# Patient Record
Sex: FEMALE | Race: BLACK OR AFRICAN AMERICAN | Employment: FULL TIME | URBAN - METROPOLITAN AREA
[De-identification: names, ages, dates, MRNs, and addresses within clinical notes are randomized per-mention and may not be internally consistent; named-entity substitution may affect disease eponyms.]

---

## 2017-01-10 ENCOUNTER — HOSPITAL ENCOUNTER (OUTPATIENT)
Dept: CV DIAGNOSTICS | Facility: HOSPITAL | Age: 42
Discharge: HOME | End: 2017-01-10
Attending: INTERNAL MEDICINE
Payer: COMMERCIAL

## 2017-01-10 LAB
ATRIAL RATE: 71 BPM
P AXIS: 50 DEGREES
P-R INTERVAL: 178 MS
Q-T INTERVAL: 384 MS
QRS DURATION: 82 MS
QTC CALCULATION (BEZET): 417 MS
R AXIS: 27 DEGREES
T AXIS: 32 DEGREES
VENTRICULAR RATE: 71 BPM

## 2017-01-10 PROCEDURE — 93005 ELECTROCARDIOGRAM TRACING: CPT

## 2017-01-10 PROCEDURE — 93010 ELECTROCARDIOGRAM REPORT: CPT | Performed by: INTERNAL MEDICINE

## 2018-01-16 ENCOUNTER — HOSPITAL ENCOUNTER (OUTPATIENT)
Dept: GENERAL RADIOLOGY | Facility: HOSPITAL | Age: 43
Discharge: HOME | End: 2018-01-16
Attending: INTERNAL MEDICINE
Payer: COMMERCIAL

## 2018-01-16 DIAGNOSIS — R20.0 NUMBNESS: ICD-10-CM

## 2018-01-16 PROCEDURE — 72050 X-RAY EXAM NECK SPINE 4/5VWS: CPT | Performed by: INTERNAL MEDICINE

## 2018-03-07 ASSESSMENT — PAIN SCALES - GENERAL: PAINLEVEL_OUTOF10: 0

## 2018-03-11 ENCOUNTER — CHARTING TRANS (OUTPATIENT)
Dept: OTHER | Age: 43
End: 2018-03-11

## 2018-03-28 ENCOUNTER — CHARTING TRANS (OUTPATIENT)
Dept: OTHER | Age: 43
End: 2018-03-28

## 2018-04-03 ENCOUNTER — CHARTING TRANS (OUTPATIENT)
Dept: OTHER | Age: 43
End: 2018-04-03

## 2018-04-23 ENCOUNTER — HOSPITAL ENCOUNTER (OUTPATIENT)
Dept: GENERAL RADIOLOGY | Facility: HOSPITAL | Age: 43
Discharge: HOME | End: 2018-04-23
Attending: INTERNAL MEDICINE
Payer: COMMERCIAL

## 2018-04-23 ENCOUNTER — EKG ENCOUNTER (OUTPATIENT)
Dept: LAB | Facility: HOSPITAL | Age: 43
End: 2018-04-23
Attending: INTERNAL MEDICINE
Payer: COMMERCIAL

## 2018-04-23 DIAGNOSIS — Z01.818 PRE-OP EXAM: ICD-10-CM

## 2018-04-23 DIAGNOSIS — Z01.818 PRE-OP EXAMINATION: Primary | ICD-10-CM

## 2018-04-23 PROCEDURE — 71046 X-RAY EXAM CHEST 2 VIEWS: CPT | Performed by: INTERNAL MEDICINE

## 2018-04-23 PROCEDURE — 93010 ELECTROCARDIOGRAM REPORT: CPT | Performed by: INTERNAL MEDICINE

## 2018-04-23 PROCEDURE — 93005 ELECTROCARDIOGRAM TRACING: CPT

## 2018-05-02 ENCOUNTER — CHARTING TRANS (OUTPATIENT)
Dept: OTHER | Age: 43
End: 2018-05-02

## 2018-05-07 ENCOUNTER — CHARTING TRANS (OUTPATIENT)
Dept: OTHER | Age: 43
End: 2018-05-07

## 2018-05-11 ENCOUNTER — CHARTING TRANS (OUTPATIENT)
Dept: OTHER | Age: 43
End: 2018-05-11

## 2018-05-22 PROBLEM — M16.11 PRIMARY OSTEOARTHRITIS OF RIGHT HIP: Status: ACTIVE | Noted: 2018-05-22

## 2018-05-30 ENCOUNTER — CHARTING TRANS (OUTPATIENT)
Dept: OTHER | Age: 43
End: 2018-05-30

## 2018-06-13 ENCOUNTER — CHARTING TRANS (OUTPATIENT)
Dept: OTHER | Age: 43
End: 2018-06-13

## 2018-06-21 ENCOUNTER — LAB ENCOUNTER (OUTPATIENT)
Dept: LAB | Facility: HOSPITAL | Age: 43
End: 2018-06-21
Attending: INTERNAL MEDICINE
Payer: COMMERCIAL

## 2018-06-21 DIAGNOSIS — J02.9 SORE THROAT: Primary | ICD-10-CM

## 2018-06-21 DIAGNOSIS — J06.9 URI (UPPER RESPIRATORY INFECTION): ICD-10-CM

## 2018-06-21 PROCEDURE — 87798 DETECT AGENT NOS DNA AMP: CPT

## 2018-06-21 PROCEDURE — 87081 CULTURE SCREEN ONLY: CPT

## 2018-06-21 PROCEDURE — 87581 M.PNEUMON DNA AMP PROBE: CPT

## 2018-06-21 PROCEDURE — 87999 UNLISTED MICROBIOLOGY PX: CPT

## 2018-06-21 PROCEDURE — 87633 RESP VIRUS 12-25 TARGETS: CPT

## 2018-06-21 PROCEDURE — 87486 CHLMYD PNEUM DNA AMP PROBE: CPT

## 2018-07-04 PROBLEM — Z85.528 HISTORY OF KIDNEY CANCER: Status: ACTIVE | Noted: 2018-07-04

## 2018-12-04 PROBLEM — M21.41 PES PLANUS OF BOTH FEET: Status: ACTIVE | Noted: 2018-12-04

## 2018-12-04 PROBLEM — R25.2 SPASMS OF THE HANDS OR FEET: Status: ACTIVE | Noted: 2018-12-04

## 2018-12-04 PROBLEM — M21.42 PES PLANUS OF BOTH FEET: Status: ACTIVE | Noted: 2018-12-04

## 2019-01-02 ENCOUNTER — LAB ENCOUNTER (OUTPATIENT)
Dept: LAB | Facility: HOSPITAL | Age: 44
End: 2019-01-02
Attending: INTERNAL MEDICINE
Payer: COMMERCIAL

## 2019-01-02 DIAGNOSIS — J06.9 URI (UPPER RESPIRATORY INFECTION): Primary | ICD-10-CM

## 2019-01-02 LAB
ADENOVIRUS PCR:: NEGATIVE
B PERT DNA SPEC QL NAA+PROBE: NEGATIVE
C PNEUM DNA SPEC QL NAA+PROBE: NEGATIVE
CORONAVIRUS 229E PCR:: POSITIVE
CORONAVIRUS HKU1 PCR:: NEGATIVE
CORONAVIRUS NL63 PCR:: NEGATIVE
CORONAVIRUS OC43 PCR:: NEGATIVE
FLUAV RNA SPEC QL NAA+PROBE: NEGATIVE
FLUBV RNA SPEC QL NAA+PROBE: NEGATIVE
METAPNEUMOVIRUS PCR:: NEGATIVE
MYCOPLASMA PNEUMONIA PCR:: NEGATIVE
PARAINFLUENZA 1 PCR:: NEGATIVE
PARAINFLUENZA 2 PCR:: NEGATIVE
PARAINFLUENZA 3 PCR:: NEGATIVE
PARAINFLUENZA 4 PCR:: NEGATIVE
RHINOVIRUS/ENTERO PCR:: NEGATIVE
RSV RNA SPEC QL NAA+PROBE: NEGATIVE

## 2019-01-02 PROCEDURE — 87798 DETECT AGENT NOS DNA AMP: CPT

## 2019-01-02 PROCEDURE — 87486 CHLMYD PNEUM DNA AMP PROBE: CPT

## 2019-01-02 PROCEDURE — 87581 M.PNEUMON DNA AMP PROBE: CPT

## 2019-01-02 PROCEDURE — 87633 RESP VIRUS 12-25 TARGETS: CPT

## 2019-01-05 ENCOUNTER — EKG ENCOUNTER (OUTPATIENT)
Dept: LAB | Facility: HOSPITAL | Age: 44
End: 2019-01-05
Attending: INTERNAL MEDICINE
Payer: COMMERCIAL

## 2019-01-05 DIAGNOSIS — Z01.818 PREOP TESTING: Primary | ICD-10-CM

## 2019-01-05 LAB
ATRIAL RATE: 80 BPM
P AXIS: 39 DEGREES
P-R INTERVAL: 172 MS
Q-T INTERVAL: 388 MS
QRS DURATION: 84 MS
QTC CALCULATION (BEZET): 447 MS
R AXIS: 9 DEGREES
T AXIS: 18 DEGREES
VENTRICULAR RATE: 80 BPM

## 2019-01-05 PROCEDURE — 93005 ELECTROCARDIOGRAM TRACING: CPT

## 2019-01-05 PROCEDURE — 93010 ELECTROCARDIOGRAM REPORT: CPT | Performed by: INTERNAL MEDICINE

## 2019-01-18 ENCOUNTER — HOSPITAL ENCOUNTER (OUTPATIENT)
Dept: CV DIAGNOSTICS | Facility: HOSPITAL | Age: 44
Discharge: HOME | End: 2019-01-18
Attending: INTERNAL MEDICINE
Payer: COMMERCIAL

## 2019-01-18 DIAGNOSIS — R94.31 ABNORMAL EKG: ICD-10-CM

## 2019-01-18 PROCEDURE — 93306 TTE W/DOPPLER COMPLETE: CPT | Performed by: INTERNAL MEDICINE

## 2019-01-21 ENCOUNTER — LABORATORY ENCOUNTER (OUTPATIENT)
Dept: LAB | Facility: HOSPITAL | Age: 44
End: 2019-01-21
Payer: COMMERCIAL

## 2019-01-21 ENCOUNTER — HOSPITAL ENCOUNTER (OUTPATIENT)
Dept: PHYSICAL THERAPY | Facility: HOSPITAL | Age: 44
Discharge: HOME | End: 2019-01-21
Attending: ORTHOPAEDIC SURGERY
Payer: COMMERCIAL

## 2019-01-21 DIAGNOSIS — M16.12 OSTEOARTHRITIS OF LEFT HIP: ICD-10-CM

## 2019-01-21 LAB
ANTIBODY SCREEN: NEGATIVE
RH BLOOD TYPE: POSITIVE

## 2019-01-21 PROCEDURE — 87081 CULTURE SCREEN ONLY: CPT

## 2019-01-21 PROCEDURE — 86901 BLOOD TYPING SEROLOGIC RH(D): CPT

## 2019-01-21 PROCEDURE — 86900 BLOOD TYPING SEROLOGIC ABO: CPT

## 2019-01-21 PROCEDURE — 86850 RBC ANTIBODY SCREEN: CPT

## 2019-01-22 ENCOUNTER — ANESTHESIA EVENT (OUTPATIENT)
Dept: SURGERY | Facility: HOSPITAL | Age: 44
End: 2019-01-22

## 2019-01-31 ENCOUNTER — APPOINTMENT (OUTPATIENT)
Dept: GENERAL RADIOLOGY | Facility: HOSPITAL | Age: 44
DRG: 470 | End: 2019-01-31
Attending: PHYSICIAN ASSISTANT
Payer: COMMERCIAL

## 2019-01-31 ENCOUNTER — HOSPITAL ENCOUNTER (INPATIENT)
Facility: HOSPITAL | Age: 44
LOS: 2 days | Discharge: HOME HEALTH | DRG: 470 | End: 2019-02-02
Attending: ORTHOPAEDIC SURGERY | Admitting: ORTHOPAEDIC SURGERY
Payer: COMMERCIAL

## 2019-01-31 ENCOUNTER — ANESTHESIA (OUTPATIENT)
Dept: SURGERY | Facility: HOSPITAL | Age: 44
End: 2019-01-31

## 2019-01-31 DIAGNOSIS — M16.12 PRIMARY OSTEOARTHRITIS OF LEFT HIP: ICD-10-CM

## 2019-01-31 DIAGNOSIS — M16.12 OSTEOARTHRITIS OF LEFT HIP: Primary | ICD-10-CM

## 2019-01-31 LAB
CREAT BLD-MCNC: 1.06 MG/DL (ref 0.55–1.02)
POCT LOT NUMBER: NORMAL
POCT URINE PREGNANCY: NEGATIVE

## 2019-01-31 PROCEDURE — 0SRB04Z REPLACEMENT OF LEFT HIP JOINT WITH CERAMIC ON POLYETHYLENE SYNTHETIC SUBSTITUTE, OPEN APPROACH: ICD-10-PCS | Performed by: ORTHOPAEDIC SURGERY

## 2019-01-31 PROCEDURE — 97530 THERAPEUTIC ACTIVITIES: CPT

## 2019-01-31 PROCEDURE — 81025 URINE PREGNANCY TEST: CPT | Performed by: ORTHOPAEDIC SURGERY

## 2019-01-31 PROCEDURE — 73501 X-RAY EXAM HIP UNI 1 VIEW: CPT | Performed by: PHYSICIAN ASSISTANT

## 2019-01-31 PROCEDURE — 3E0T3BZ INTRODUCTION OF ANESTHETIC AGENT INTO PERIPHERAL NERVES AND PLEXI, PERCUTANEOUS APPROACH: ICD-10-PCS | Performed by: ANESTHESIOLOGY

## 2019-01-31 PROCEDURE — 88304 TISSUE EXAM BY PATHOLOGIST: CPT | Performed by: ORTHOPAEDIC SURGERY

## 2019-01-31 PROCEDURE — 97161 PT EVAL LOW COMPLEX 20 MIN: CPT

## 2019-01-31 PROCEDURE — 88311 DECALCIFY TISSUE: CPT | Performed by: ORTHOPAEDIC SURGERY

## 2019-01-31 PROCEDURE — 76942 ECHO GUIDE FOR BIOPSY: CPT | Performed by: ORTHOPAEDIC SURGERY

## 2019-01-31 PROCEDURE — 82565 ASSAY OF CREATININE: CPT | Performed by: PHYSICIAN ASSISTANT

## 2019-01-31 DEVICE — PINNACLE 100 ACETABULAR SHELL GRIPTION 100 50MM OD
Type: IMPLANTABLE DEVICE | Site: HIP | Status: FUNCTIONAL
Brand: PINNACLE GRIPTION

## 2019-01-31 DEVICE — BIOLOX DELTA CERAMIC FEMORAL HEAD 32MM DIA +1 12/14 TAPER
Type: IMPLANTABLE DEVICE | Site: HIP | Status: FUNCTIONAL
Brand: BIOLOX DELTA

## 2019-01-31 DEVICE — PINNACLE HIP SOLUTIONS ALTRX POLYETHYLENE ACETABULAR LINER +4 NEUTRAL 32MM ID 50MM OD
Type: IMPLANTABLE DEVICE | Site: HIP | Status: FUNCTIONAL
Brand: PINNACLE ALTRX

## 2019-01-31 DEVICE — APEX HOLE ELIMINATOR - PS
Type: IMPLANTABLE DEVICE | Site: HIP | Status: FUNCTIONAL
Brand: APEX

## 2019-01-31 DEVICE — CORAIL HIP SYSTEM CEMENTLESS FEMORAL STEM 12/14 AMT 135 DEGREES KS SIZE 10 HA COATED STANDARD NO COLLAR
Type: IMPLANTABLE DEVICE | Site: HIP | Status: FUNCTIONAL
Brand: CORAIL

## 2019-01-31 RX ORDER — POLYETHYLENE GLYCOL 3350 17 G/17G
17 POWDER, FOR SOLUTION ORAL DAILY PRN
Status: DISCONTINUED | OUTPATIENT
Start: 2019-01-31 | End: 2019-02-02

## 2019-01-31 RX ORDER — ZOLPIDEM TARTRATE 5 MG/1
5 TABLET ORAL NIGHTLY PRN
Status: DISCONTINUED | OUTPATIENT
Start: 2019-01-31 | End: 2019-02-02

## 2019-01-31 RX ORDER — DIPHENHYDRAMINE HYDROCHLORIDE 50 MG/ML
12.5 INJECTION INTRAMUSCULAR; INTRAVENOUS AS NEEDED
Status: DISCONTINUED | OUTPATIENT
Start: 2019-01-31 | End: 2019-01-31 | Stop reason: HOSPADM

## 2019-01-31 RX ORDER — METOCLOPRAMIDE HYDROCHLORIDE 5 MG/ML
10 INJECTION INTRAMUSCULAR; INTRAVENOUS AS NEEDED
Status: DISCONTINUED | OUTPATIENT
Start: 2019-01-31 | End: 2019-01-31 | Stop reason: HOSPADM

## 2019-01-31 RX ORDER — CLINDAMYCIN PHOSPHATE 900 MG/50ML
INJECTION INTRAVENOUS
Status: DISPENSED
Start: 2019-01-31 | End: 2019-01-31

## 2019-01-31 RX ORDER — SCOLOPAMINE TRANSDERMAL SYSTEM 1 MG/1
1 PATCH, EXTENDED RELEASE TRANSDERMAL ONCE
Status: DISCONTINUED | OUTPATIENT
Start: 2019-01-31 | End: 2019-02-02

## 2019-01-31 RX ORDER — CLINDAMYCIN PHOSPHATE 900 MG/50ML
900 INJECTION INTRAVENOUS EVERY 8 HOURS
Status: COMPLETED | OUTPATIENT
Start: 2019-01-31 | End: 2019-01-31

## 2019-01-31 RX ORDER — ACETAMINOPHEN 500 MG
1000 TABLET ORAL ONCE
Status: DISCONTINUED | OUTPATIENT
Start: 2019-01-31 | End: 2019-01-31 | Stop reason: HOSPADM

## 2019-01-31 RX ORDER — METOCLOPRAMIDE HYDROCHLORIDE 5 MG/ML
10 INJECTION INTRAMUSCULAR; INTRAVENOUS EVERY 6 HOURS PRN
Status: ACTIVE | OUTPATIENT
Start: 2019-01-31 | End: 2019-02-02

## 2019-01-31 RX ORDER — SODIUM CHLORIDE, SODIUM LACTATE, POTASSIUM CHLORIDE, CALCIUM CHLORIDE 600; 310; 30; 20 MG/100ML; MG/100ML; MG/100ML; MG/100ML
INJECTION, SOLUTION INTRAVENOUS CONTINUOUS
Status: DISCONTINUED | OUTPATIENT
Start: 2019-01-31 | End: 2019-01-31 | Stop reason: HOSPADM

## 2019-01-31 RX ORDER — HYDROMORPHONE HYDROCHLORIDE 1 MG/ML
0.8 INJECTION, SOLUTION INTRAMUSCULAR; INTRAVENOUS; SUBCUTANEOUS EVERY 2 HOUR PRN
Status: ACTIVE | OUTPATIENT
Start: 2019-01-31 | End: 2019-02-02

## 2019-01-31 RX ORDER — BISACODYL 10 MG
10 SUPPOSITORY, RECTAL RECTAL
Status: DISCONTINUED | OUTPATIENT
Start: 2019-01-31 | End: 2019-02-02

## 2019-01-31 RX ORDER — HYDROCODONE BITARTRATE AND ACETAMINOPHEN 5; 325 MG/1; MG/1
1 TABLET ORAL AS NEEDED
Status: DISCONTINUED | OUTPATIENT
Start: 2019-01-31 | End: 2019-01-31 | Stop reason: HOSPADM

## 2019-01-31 RX ORDER — HYDROCODONE BITARTRATE AND ACETAMINOPHEN 5; 325 MG/1; MG/1
2 TABLET ORAL AS NEEDED
Status: DISCONTINUED | OUTPATIENT
Start: 2019-01-31 | End: 2019-01-31 | Stop reason: HOSPADM

## 2019-01-31 RX ORDER — HYDROMORPHONE HYDROCHLORIDE 1 MG/ML
0.4 INJECTION, SOLUTION INTRAMUSCULAR; INTRAVENOUS; SUBCUTANEOUS EVERY 2 HOUR PRN
Status: ACTIVE | OUTPATIENT
Start: 2019-01-31 | End: 2019-02-02

## 2019-01-31 RX ORDER — SODIUM CHLORIDE, SODIUM LACTATE, POTASSIUM CHLORIDE, CALCIUM CHLORIDE 600; 310; 30; 20 MG/100ML; MG/100ML; MG/100ML; MG/100ML
INJECTION, SOLUTION INTRAVENOUS CONTINUOUS
Status: DISCONTINUED | OUTPATIENT
Start: 2019-01-31 | End: 2019-02-02

## 2019-01-31 RX ORDER — ONDANSETRON 2 MG/ML
4 INJECTION INTRAMUSCULAR; INTRAVENOUS AS NEEDED
Status: DISCONTINUED | OUTPATIENT
Start: 2019-01-31 | End: 2019-01-31 | Stop reason: HOSPADM

## 2019-01-31 RX ORDER — SODIUM CHLORIDE 9 MG/ML
INJECTION, SOLUTION INTRAVENOUS CONTINUOUS
Status: DISCONTINUED | OUTPATIENT
Start: 2019-01-31 | End: 2019-02-02

## 2019-01-31 RX ORDER — ONDANSETRON 2 MG/ML
4 INJECTION INTRAMUSCULAR; INTRAVENOUS EVERY 4 HOURS PRN
Status: DISPENSED | OUTPATIENT
Start: 2019-01-31 | End: 2019-02-02

## 2019-01-31 RX ORDER — CLINDAMYCIN PHOSPHATE 900 MG/50ML
900 INJECTION INTRAVENOUS ONCE
Status: COMPLETED | OUTPATIENT
Start: 2019-01-31 | End: 2019-01-31

## 2019-01-31 RX ORDER — SCOLOPAMINE TRANSDERMAL SYSTEM 1 MG/1
PATCH, EXTENDED RELEASE TRANSDERMAL
Status: DISCONTINUED
Start: 2019-01-31 | End: 2019-02-02

## 2019-01-31 RX ORDER — OXYCODONE HYDROCHLORIDE 10 MG/1
10 TABLET ORAL EVERY 4 HOURS PRN
Status: DISCONTINUED | OUTPATIENT
Start: 2019-01-31 | End: 2019-02-01

## 2019-01-31 RX ORDER — HYDROMORPHONE HYDROCHLORIDE 1 MG/ML
0.4 INJECTION, SOLUTION INTRAMUSCULAR; INTRAVENOUS; SUBCUTANEOUS EVERY 5 MIN PRN
Status: DISCONTINUED | OUTPATIENT
Start: 2019-01-31 | End: 2019-01-31 | Stop reason: HOSPADM

## 2019-01-31 RX ORDER — OXYCODONE HYDROCHLORIDE 15 MG/1
15 TABLET ORAL EVERY 4 HOURS PRN
Status: DISCONTINUED | OUTPATIENT
Start: 2019-01-31 | End: 2019-02-01

## 2019-01-31 RX ORDER — MELATONIN
325
Status: DISCONTINUED | OUTPATIENT
Start: 2019-01-31 | End: 2019-02-02

## 2019-01-31 RX ORDER — OXYCODONE HYDROCHLORIDE 5 MG/1
5 TABLET ORAL EVERY 4 HOURS PRN
Status: DISCONTINUED | OUTPATIENT
Start: 2019-01-31 | End: 2019-02-01

## 2019-01-31 RX ORDER — TIZANIDINE 4 MG/1
4 TABLET ORAL 3 TIMES DAILY PRN
Status: DISCONTINUED | OUTPATIENT
Start: 2019-01-31 | End: 2019-02-02

## 2019-01-31 RX ORDER — HYDROMORPHONE HYDROCHLORIDE 1 MG/ML
0.2 INJECTION, SOLUTION INTRAMUSCULAR; INTRAVENOUS; SUBCUTANEOUS EVERY 2 HOUR PRN
Status: ACTIVE | OUTPATIENT
Start: 2019-01-31 | End: 2019-02-02

## 2019-01-31 RX ORDER — NALOXONE HYDROCHLORIDE 0.4 MG/ML
80 INJECTION, SOLUTION INTRAMUSCULAR; INTRAVENOUS; SUBCUTANEOUS AS NEEDED
Status: DISCONTINUED | OUTPATIENT
Start: 2019-01-31 | End: 2019-01-31 | Stop reason: HOSPADM

## 2019-01-31 RX ORDER — DIPHENHYDRAMINE HYDROCHLORIDE 50 MG/ML
25 INJECTION INTRAMUSCULAR; INTRAVENOUS ONCE AS NEEDED
Status: ACTIVE | OUTPATIENT
Start: 2019-01-31 | End: 2019-01-31

## 2019-01-31 RX ORDER — DIPHENHYDRAMINE HCL 25 MG
25 CAPSULE ORAL EVERY 4 HOURS PRN
Status: DISCONTINUED | OUTPATIENT
Start: 2019-01-31 | End: 2019-02-02

## 2019-01-31 RX ORDER — ACETAMINOPHEN 325 MG/1
TABLET ORAL
Status: COMPLETED
Start: 2019-01-31 | End: 2019-01-31

## 2019-01-31 RX ORDER — DIPHENHYDRAMINE HYDROCHLORIDE 50 MG/ML
12.5 INJECTION INTRAMUSCULAR; INTRAVENOUS EVERY 4 HOURS PRN
Status: DISCONTINUED | OUTPATIENT
Start: 2019-01-31 | End: 2019-02-02

## 2019-01-31 RX ORDER — ACETAMINOPHEN 325 MG/1
650 TABLET ORAL ONCE
Status: COMPLETED | OUTPATIENT
Start: 2019-01-31 | End: 2019-01-31

## 2019-01-31 RX ORDER — LABETALOL HYDROCHLORIDE 5 MG/ML
5 INJECTION, SOLUTION INTRAVENOUS EVERY 5 MIN PRN
Status: DISCONTINUED | OUTPATIENT
Start: 2019-01-31 | End: 2019-01-31 | Stop reason: HOSPADM

## 2019-01-31 RX ORDER — KETOROLAC TROMETHAMINE 30 MG/ML
30 INJECTION, SOLUTION INTRAMUSCULAR; INTRAVENOUS EVERY 6 HOURS
Status: COMPLETED | OUTPATIENT
Start: 2019-01-31 | End: 2019-02-01

## 2019-01-31 RX ORDER — SODIUM PHOSPHATE, DIBASIC AND SODIUM PHOSPHATE, MONOBASIC 7; 19 G/133ML; G/133ML
1 ENEMA RECTAL ONCE AS NEEDED
Status: DISCONTINUED | OUTPATIENT
Start: 2019-01-31 | End: 2019-02-02

## 2019-01-31 RX ORDER — ACETAMINOPHEN 325 MG/1
650 TABLET ORAL 4 TIMES DAILY
Status: DISCONTINUED | OUTPATIENT
Start: 2019-01-31 | End: 2019-02-01

## 2019-01-31 RX ORDER — DOCUSATE SODIUM 100 MG/1
100 CAPSULE, LIQUID FILLED ORAL 2 TIMES DAILY
Status: DISCONTINUED | OUTPATIENT
Start: 2019-01-31 | End: 2019-02-02

## 2019-01-31 NOTE — ANESTHESIA PREPROCEDURE EVALUATION
PRE-OP EVALUATION    Patient Name: Caren Vargas    Pre-op Diagnosis: Primary osteoarthritis of left hip [M16.12]    Procedure(s):  LEFT TOTAL HIP ARTHROPLASTY    Surgeon(s) and Role:     Cassia Shah MD - Primary    Pre-op vitals reviewed. Never Smoker      Smokeless tobacco: Never Used      Tobacco comment: caffeine: 1 c coffee qam    Alcohol use: No      Alcohol/week: 0.0 oz      Drug use: No     ----------------------------------------------------------------------------    Conclusions: agrees to proceed.

## 2019-01-31 NOTE — BRIEF OP NOTE
Pre-Operative Diagnosis: Primary osteoarthritis of left hip [M16.12]     Post-Operative Diagnosis: Primary osteoarthritis of left hip [M16.12]      Procedure Performed:   Procedure(s):  LEFT TOTAL HIP ARTHROPLASTY    Surgeon(s) and Role:     Rustam Silver

## 2019-01-31 NOTE — OPERATIVE REPORT
PATIENT'S NAME: Keren Tam   ATTENDING PHYSICIAN: Chris Martinez MD   OPERATING PHYSICIAN: Chris Martinez MD   PATIENT ACCOUNT#:   [de-identified]    LOCATION:  [address] [place] [address],[address] RECORD #:   [id number]    YOB: 1975  ADMISS the muscle fibers carrying the incision across the anterior aspect of the greater trochanter and down in line with the vastus lateralis.   The tendon was subperiosteally elevated off the anterior aspect of the greater trochanter down to  capsule, which was with good range of motion and no instability. The trial head was removed. After cleaning the [facility] taper, the final head was impacted in place with good fixation.   The final reduction was performed and the hip was put through good range of motion with go

## 2019-01-31 NOTE — H&P
Yoko Keenan   1/22/2019 3:15 PM Office Visit   MRN: [id number]   Description: 37year old female Provider: Leslie Kinsey MD Department: [facility]        Scanning Cover Sheet   Click to print Barcode Encounter Cover Sheet for scanning Alcohol use: No   Alcohol/week: 0.0 oz   Drug use: No     Family History:            Family History   Problem Relation Age of Onset   • Hypertension Father    • Hypertension Mother    • Cancer Maternal Grandmother     colon   ALLERGIES:     Ampicillin HIVE The patient was seen by the primary care physician for preoperative clearance, the [facility] hospitalist will be consulted postoperatively for medical management. The patient will plan on going home with home health postoperatively.    The patient indicates und

## 2019-01-31 NOTE — INTERVAL H&P NOTE
Pre-op Diagnosis: Primary osteoarthritis of left hip [M16.12]    The above referenced H&P was reviewed by Emerson Pruitt MD on 1/31/2019, the patient was examined and no significant changes have occurred in the patient's condition since the H&P was perfo

## 2019-02-01 PROBLEM — M16.12 OSTEOARTHRITIS OF LEFT HIP: Status: ACTIVE | Noted: 2018-05-22

## 2019-02-01 LAB
ANION GAP SERPL CALC-SCNC: 5 MMOL/L (ref 0–18)
BUN BLD-MCNC: 6 MG/DL (ref 8–20)
BUN/CREAT SERPL: 7.1 (ref 10–20)
CALCIUM BLD-MCNC: 8.3 MG/DL (ref 8.3–10.3)
CHLORIDE SERPL-SCNC: 108 MMOL/L (ref 101–111)
CO2 SERPL-SCNC: 24 MMOL/L (ref 22–32)
CREAT BLD-MCNC: 0.84 MG/DL (ref 0.55–1.02)
DEPRECATED RDW RBC AUTO: 46.9 FL (ref 35.1–46.3)
ERYTHROCYTE [DISTWIDTH] IN BLOOD BY AUTOMATED COUNT: 14.6 % (ref 11–15)
GLUCOSE BLD-MCNC: 102 MG/DL (ref 70–99)
HCT VFR BLD AUTO: 34 % (ref 35–48)
HGB BLD-MCNC: 11.3 G/DL (ref 12–16)
MCH RBC QN AUTO: 29 PG (ref 26–34)
MCHC RBC AUTO-ENTMCNC: 33.2 G/DL (ref 31–37)
MCV RBC AUTO: 87.4 FL (ref 80–100)
OSMOLALITY SERPL CALC.SUM OF ELEC: 282 MOSM/KG (ref 275–295)
PLATELET # BLD AUTO: 238 10(3)UL (ref 150–450)
POTASSIUM SERPL-SCNC: 4.6 MMOL/L (ref 3.6–5.1)
RBC # BLD AUTO: 3.89 X10(6)UL (ref 3.8–5.3)
SODIUM SERPL-SCNC: 137 MMOL/L (ref 136–144)
WBC # BLD AUTO: 10.8 X10(3) UL (ref 4–11)

## 2019-02-01 PROCEDURE — 85027 COMPLETE CBC AUTOMATED: CPT | Performed by: PHYSICIAN ASSISTANT

## 2019-02-01 PROCEDURE — 97116 GAIT TRAINING THERAPY: CPT

## 2019-02-01 PROCEDURE — 97150 GROUP THERAPEUTIC PROCEDURES: CPT

## 2019-02-01 PROCEDURE — 97535 SELF CARE MNGMENT TRAINING: CPT

## 2019-02-01 PROCEDURE — 80048 BASIC METABOLIC PNL TOTAL CA: CPT | Performed by: PHYSICIAN ASSISTANT

## 2019-02-01 PROCEDURE — 97165 OT EVAL LOW COMPLEX 30 MIN: CPT

## 2019-02-01 RX ORDER — CALCIUM CARBONATE 200(500)MG
500 TABLET,CHEWABLE ORAL
Status: DISCONTINUED | OUTPATIENT
Start: 2019-02-01 | End: 2019-02-02

## 2019-02-01 RX ORDER — AMLODIPINE BESYLATE 5 MG/1
5 TABLET ORAL
Status: DISCONTINUED | OUTPATIENT
Start: 2019-02-02 | End: 2019-02-02

## 2019-02-01 RX ORDER — HYDROCODONE BITARTRATE AND ACETAMINOPHEN 10; 325 MG/1; MG/1
2 TABLET ORAL EVERY 4 HOURS PRN
Status: DISCONTINUED | OUTPATIENT
Start: 2019-02-01 | End: 2019-02-02

## 2019-02-01 RX ORDER — ACETAMINOPHEN 325 MG/1
650 TABLET ORAL EVERY 4 HOURS PRN
Status: DISCONTINUED | OUTPATIENT
Start: 2019-02-01 | End: 2019-02-02

## 2019-02-01 RX ORDER — HYDROCODONE BITARTRATE AND ACETAMINOPHEN 10; 325 MG/1; MG/1
1 TABLET ORAL EVERY 4 HOURS PRN
Status: DISCONTINUED | OUTPATIENT
Start: 2019-02-01 | End: 2019-02-02

## 2019-02-01 NOTE — PLAN OF CARE
Impaired Activities of Daily Living    • Achieve highest/safest level of independence in self care Adequate for Discharge          Impaired Functional Mobility    • Achieve highest/safest level of mobility/gait Progressing        PAIN - ADULT    • Vance Dubose

## 2019-02-01 NOTE — CONSULTS
General Medicine Consult      Consulted by: Robbi Sacks, MD    PCP: Sarai Brennan MD    Reason for Consultation: Medical Management    History of Present Illness: Patient is a 37year old female with PMH including but not limited to HTN, GERD,  who p/t colon       Review of Systems  Comprehensive ROS reviewed and negative except for what's stated above. Including negative for chest pain, shortness of breath, syncope.        OBJECTIVE:  /73   Pulse 97   Temp 98.6 °F (37 °C) (Oral)   Resp 20 activity as tolerated   -IS 10x/hr for atelectasis;  Instructed on use of IS  -PO food/fluid as lakia, bowel regimen  -check AM CBC assess for ABLA, on iron  -guy/post-op abx per surgery    # Acute on chronic pain, expected  -IV pain meds as needed, will mon

## 2019-02-01 NOTE — PHYSICAL THERAPY NOTE
PHYSICAL THERAPY HIP TREATMENT NOTE - INPATIENT      Room Number: 358/358-A     Session: 1&2  Number of Visits to Meet Established Goals: 3    Presenting Problem: S/p Left GARIMA on 01/31/19    Problem List  Active Problems:    Osteoarthritis of left hip down on and standing up from a chair with arms (e.g., wheelchair, bedside commode, etc.): None   -   Moving from lying on back to sitting on the side of the bed?: A Little   How much help from another person does the patient currently need. ..   -   Moving reps    Forward, back steps 10 reps    Short Squats 10 reps          Comments:  Pt participated in group session, tolerance was good in am and fair in pm.   was present yes   is a     Patient End of Session: Up in chair;Needs met;Call [facility]

## 2019-02-01 NOTE — CM/SW NOTE
02/01/19 1100   CM/SW Referral Data   Referral Source Physician   Reason for Referral Discharge planning   Informant Patient   Pertinent Medical Hx   Primary Care Physician Name Tamela Villegas   Patient Info   Patient's Mental Status Alert;Oriented   Wilda Parents

## 2019-02-01 NOTE — OCCUPATIONAL THERAPY NOTE
OCCUPATIONAL THERAPY QUICK EVALUATION - INPATIENT    Room Number: 358/358-A  Evaluation Date: 2/1/2019     Type of Evaluation: Quick Eval  Presenting Problem: (L GARIMA)    Physician Order: IP Consult to Occupational Therapy  Reason for Therapy:  ADL/IADL Dys (Comment)(MD specific no active abduction)  Fall Risk: High fall risk    WEIGHT BEARING RESTRICTION  Weight Bearing Restriction: L lower extremity           L Lower Extremity: Weight Bearing as Tolerated    PAIN ASSESSMENT  Ratin  Location: (L hip)  Ma bed mobility while maintaining hip precs with supervision. Patient was educated to have supervision for initial shower at home. Car transfer was explained to patient, understanding voiced. Patient End of Session: Up in chair; With [facility] staff;Needs met; Beto needs at this time. Patient discharged from Occupational Therapy services. Please re-order if a new functional limitation presents during this admission.     Patient was able to achieve the following goals:  Patient able to toilet transfer: safely with shelton

## 2019-02-01 NOTE — PROGRESS NOTES
[facility]  Progress Note    Ken Patton Patient Status:  Inpatient    1975 MRN [id number]   [facility] 3SW-A Attending Sasha Morley MD   Hosp Day # 1 PCP Arlin Carvalho MD     SUBJECTIVE:  INTERVAL HISTORY: S/P  1  Procedu management  6.  Follow up in office with Melia Isaac MD in 2 weeks      Jemima Perla PA-C  2/1/2019  8:18 AM

## 2019-02-01 NOTE — PROGRESS NOTES
[facility]  hospitalist daily note  Patient was seen/xamined on 2/1/19    S: no chest pain, no SOB, no abd pain, no nausea/emesis today  No BM yet   + urinating  No numbness/tingling LLE  Pt denies bleeding    lindsay in Epic    PE    02/01/19  0730   BP: 116/7

## 2019-02-02 VITALS
BODY MASS INDEX: 33.38 KG/M2 | HEIGHT: 64 IN | WEIGHT: 195.56 LBS | TEMPERATURE: 98 F | RESPIRATION RATE: 18 BRPM | OXYGEN SATURATION: 95 % | SYSTOLIC BLOOD PRESSURE: 136 MMHG | DIASTOLIC BLOOD PRESSURE: 68 MMHG | HEART RATE: 86 BPM

## 2019-02-02 LAB
DEPRECATED RDW RBC AUTO: 48.1 FL (ref 35.1–46.3)
ERYTHROCYTE [DISTWIDTH] IN BLOOD BY AUTOMATED COUNT: 14.8 % (ref 11–15)
HCT VFR BLD AUTO: 31.5 % (ref 35–48)
HGB BLD-MCNC: 10.3 G/DL (ref 12–16)
MCH RBC QN AUTO: 28.6 PG (ref 26–34)
MCHC RBC AUTO-ENTMCNC: 32.7 G/DL (ref 31–37)
MCV RBC AUTO: 87.5 FL (ref 80–100)
PLATELET # BLD AUTO: 234 10(3)UL (ref 150–450)
RBC # BLD AUTO: 3.6 X10(6)UL (ref 3.8–5.3)
WBC # BLD AUTO: 8.8 X10(3) UL (ref 4–11)

## 2019-02-02 PROCEDURE — 97530 THERAPEUTIC ACTIVITIES: CPT

## 2019-02-02 PROCEDURE — 97150 GROUP THERAPEUTIC PROCEDURES: CPT

## 2019-02-02 PROCEDURE — 85027 COMPLETE CBC AUTOMATED: CPT | Performed by: PHYSICIAN ASSISTANT

## 2019-02-02 NOTE — PROGRESS NOTES
[facility]  Orthopedic Surgery  Progress Note    Sierrajj Méndezabbey Patient Status:  Inpatient    1975 MRN [id number]   [facility] 3SW-A Attending Bradley Hernandez MD   Hosp Day # 2 PCP Anabelle Bacon MD     SUBJECTIVE:  INTERVAL

## 2019-02-02 NOTE — PROGRESS NOTES
[facility] hospitalist daily note  Patient was seen/xamined on 2/2/19     S: got called by RN  Because pt  had nausea and no BM   + passing gas. Suppository given and then patient had BM. Nausea resolved.  No emesis  No chest pain  No SOB  + urinating     rafael

## 2019-02-02 NOTE — PROGRESS NOTES
Acute Pain Service    Post Op Day 2 Ortho Note    Assessed patient in chair. Patient states Flosamene Ponder is working well to manage pain; denies itching/dizziness. Reports mild persistent nausea; managed with antiemetics.  Reports nausea is \"much better\" today an

## 2019-02-02 NOTE — PROGRESS NOTES
NURSING DISCHARGE NOTE    Discharged Home with [place] via Wheelchair. Accompanied by Family member  Belongings Taken by patient/family. Patient and family has watched the discharge video and all questions were answered. She has all her scripts at home.

## 2019-02-02 NOTE — CM/SW NOTE
02/02/19 1700   Discharge disposition   Expected discharge disposition Home-Health   Name of Facillity/Home Care/Hospice [facility]   Discharge transportation Private car   AVS sent via [address]

## 2019-02-02 NOTE — PHYSICAL THERAPY NOTE
PHYSICAL THERAPY HIP TREATMENT NOTE - INPATIENT      Room Number: 358/358-A     Session: 3  Number of Visits to Meet Established Goals: 3    Presenting Problem: S/p Left GARIMA on 01/31/19         Problem List  Active Problems:    Osteoarthritis of left hip bedside commode, etc.): None   -   Moving from lying on back to sitting on the side of the bed?: None   How much help from another person does the patient currently need. ..   -   Moving to and from a bed to a chair (including a wheelchair)?: None   -   Chichi Quant education; Family education;Gait training;Neuromuscular re-educate;Strengthening;Stoop training;Stair training;Transfer training;Balance training  Rehab Potential : Good  Frequency (Obs): Daily    CURRENT GOALS  Goal #1  Patient is able to demonstrate supin

## 2019-02-04 PROBLEM — Z47.89 ORTHOPEDIC AFTERCARE: Status: ACTIVE | Noted: 2019-02-04

## 2019-02-04 NOTE — DISCHARGE SUMMARY
Patient ID:  Jacky Rodríguez  [id number]  37year old  4/30/1975    Admit Date: 1/31/2019    Discharge Date and Time: 2/2/2019     Attending Physician: Hemalatha Martinez MD    Reason for admission: Primary osteoarthritis of left hip [M16.12]    Discharge D

## 2019-02-06 NOTE — PAYOR COMM NOTE
--------------  DISCHARGE REVIEW    Payor: Ute Haas  #:  [id number]  Authorization Number: [id number]    Admit date: 1/31/19  Admit time:  0148  Discharge Date: 2/2/2019  1:34 PM     Admitting Physician: Zeynep Monroy MD  Attending Tab  Take 1 tablet (325 mg total) by mouth daily. , Normal, Disp-30 tablet, R-0    apixaban 2.5 MG Oral Tab  Take 1 tablet (2.5 mg total) by mouth 2 (two) times daily for 26 days. , Normal, Disp-52 tablet, R-0    HYDROcodone-acetaminophen (NORCO)  MG O

## 2019-03-06 VITALS — HEIGHT: 64 IN | BODY MASS INDEX: 31.24 KG/M2 | WEIGHT: 183 LBS

## 2019-03-12 PROBLEM — Z96.642 STATUS POST TOTAL REPLACEMENT OF LEFT HIP: Status: ACTIVE | Noted: 2019-03-12

## 2019-08-13 PROBLEM — M70.62 GREATER TROCHANTERIC BURSITIS, LEFT: Status: ACTIVE | Noted: 2019-08-13

## 2020-01-06 PROBLEM — M16.11 PRIMARY OSTEOARTHRITIS OF RIGHT HIP: Status: ACTIVE | Noted: 2020-01-06

## 2020-02-12 ENCOUNTER — OFFICE VISIT (OUTPATIENT)
Dept: FAMILY MEDICINE CLINIC | Facility: CLINIC | Age: 45
End: 2020-02-12
Payer: COMMERCIAL

## 2020-02-12 VITALS
HEART RATE: 88 BPM | WEIGHT: 187.63 LBS | OXYGEN SATURATION: 97 % | TEMPERATURE: 99 F | HEIGHT: 64 IN | RESPIRATION RATE: 18 BRPM | BODY MASS INDEX: 32.03 KG/M2 | DIASTOLIC BLOOD PRESSURE: 70 MMHG | SYSTOLIC BLOOD PRESSURE: 124 MMHG

## 2020-02-12 DIAGNOSIS — Z01.818 PRE-OP EXAM: Primary | ICD-10-CM

## 2020-02-12 DIAGNOSIS — E66.9 OBESITY (BMI 30-39.9): ICD-10-CM

## 2020-02-12 DIAGNOSIS — M16.11 PRIMARY OSTEOARTHRITIS OF RIGHT HIP: ICD-10-CM

## 2020-02-12 DIAGNOSIS — D50.9 IRON DEFICIENCY ANEMIA, UNSPECIFIED IRON DEFICIENCY ANEMIA TYPE: ICD-10-CM

## 2020-02-12 DIAGNOSIS — I10 ESSENTIAL HYPERTENSION, BENIGN: ICD-10-CM

## 2020-02-12 PROBLEM — R25.2 SPASMS OF THE HANDS OR FEET: Status: RESOLVED | Noted: 2018-12-04 | Resolved: 2020-02-12

## 2020-02-12 PROBLEM — Z47.89 ORTHOPEDIC AFTERCARE: Status: RESOLVED | Noted: 2019-02-04 | Resolved: 2020-02-12

## 2020-02-12 PROCEDURE — 93000 ELECTROCARDIOGRAM COMPLETE: CPT | Performed by: FAMILY MEDICINE

## 2020-02-12 PROCEDURE — 99203 OFFICE O/P NEW LOW 30 MIN: CPT | Performed by: FAMILY MEDICINE

## 2020-02-12 NOTE — PATIENT INSTRUCTIONS
Eating Heart-Healthy Foods  Eating has a big impact on your heart health. In fact, eating healthier can improve several of your heart risks at once. For instance, it helps you manage weight, cholesterol, and blood pressure.  Here are ideas to help you jere Aim to make these foods staples of your diet. If you have diabetes, you may have different recommendations than what is listed here:  · Fruits and vegetables provide plenty of nutrients without a lot of calories.  At meals, fill half your plate with these f · Choose ingredients that spice up your food without adding calories, fat, or sodium. Try these items: horseradish, hot sauce, lemon, mustard, nonfat salad dressings, and vinegar.  For salt-free herbs and spices, try basil, cilantro, cinnamon, pepper, and r

## 2020-02-13 NOTE — PROGRESS NOTES
CHIEF COMPLAINT: Patient presents with:  Pre-Op Exam: Right Hip Replacement with  on 3/5/2020        HPI:     Omega Wharton is a 53year old female presents for pre-op exam.    Pt is here for a pre-op physical.     Procedure: Rt hip arthroplas colon      Social History: Social History    Tobacco Use      Smoking status: Never Smoker      Smokeless tobacco: Never Used      Tobacco comment: caffeine: 1 c coffee qam    Alcohol use: No      Alcohol/week: 0.0 standard drinks    Drug use: No       Med 01/31/2019, Discharged on 02/02/2019   Component Date Value   • POCT urine pregnancy 01/31/2019 Negative    • POCT LOT NUMBER 01/31/2019 0,538,152    • Procedure Control 01/31/2019 ok    • EXPIRATION DATE 01/31/2019 2,020/5    • Case Report 01/31/2019 • RDW-SD 02/01/2019 46.9*   • WBC 02/02/2019 8.8    • RBC 02/02/2019 3.60*   • HGB 02/02/2019 10.3*   • HCT 02/02/2019 31.5*   • PLT 02/02/2019 234.0    • MCV 02/02/2019 87.5    • MCH 02/02/2019 28.6    • MCHC 02/02/2019 32.7    • RDW 02/02/2019 14.8 04/25/2011  Mammogram due on 04/21/2015  Annual Depression Screen due on 02/12/2021  Influenza Vaccine Completed      Patient/Caregiver Education: There are no barriers to learning. Medical education done.    Outcome: Patient verbalizes understanding and ag

## 2020-02-18 ENCOUNTER — TELEPHONE (OUTPATIENT)
Dept: FAMILY MEDICINE CLINIC | Facility: CLINIC | Age: 45
End: 2020-02-18

## 2020-02-18 ENCOUNTER — LABORATORY ENCOUNTER (OUTPATIENT)
Dept: LAB | Facility: HOSPITAL | Age: 45
End: 2020-02-18
Payer: COMMERCIAL

## 2020-02-18 DIAGNOSIS — M16.11 PRIMARY OSTEOARTHRITIS OF RIGHT HIP: ICD-10-CM

## 2020-02-18 LAB
ANION GAP SERPL CALC-SCNC: 4 MMOL/L (ref 0–18)
ANTIBODY SCREEN: NEGATIVE
BASOPHILS # BLD AUTO: 0.05 X10(3) UL (ref 0–0.2)
BASOPHILS NFR BLD AUTO: 1 %
BUN BLD-MCNC: 11 MG/DL (ref 7–18)
BUN/CREAT SERPL: 12.5 (ref 10–20)
CALCIUM BLD-MCNC: 8.7 MG/DL (ref 8.5–10.1)
CHLORIDE SERPL-SCNC: 110 MMOL/L (ref 98–112)
CHOLEST SMN-MCNC: 167 MG/DL (ref ?–200)
CO2 SERPL-SCNC: 23 MMOL/L (ref 21–32)
CREAT BLD-MCNC: 0.88 MG/DL (ref 0.55–1.02)
DEPRECATED HBV CORE AB SER IA-ACNC: 38.8 NG/ML (ref 12–240)
DEPRECATED RDW RBC AUTO: 49.4 FL (ref 35.1–46.3)
EOSINOPHIL # BLD AUTO: 0.1 X10(3) UL (ref 0–0.7)
EOSINOPHIL NFR BLD AUTO: 1.9 %
ERYTHROCYTE [DISTWIDTH] IN BLOOD BY AUTOMATED COUNT: 15.3 % (ref 11–15)
EST. AVERAGE GLUCOSE BLD GHB EST-MCNC: 111 MG/DL (ref 68–126)
GLUCOSE BLD-MCNC: 85 MG/DL (ref 70–99)
HBA1C MFR BLD HPLC: 5.5 % (ref ?–5.7)
HCT VFR BLD AUTO: 40.6 % (ref 35–48)
HDLC SERPL-MCNC: 55 MG/DL (ref 40–59)
HGB BLD-MCNC: 13 G/DL (ref 12–16)
IMM GRANULOCYTES # BLD AUTO: 0.01 X10(3) UL (ref 0–1)
IMM GRANULOCYTES NFR BLD: 0.2 %
IRON SATURATION: 26 % (ref 15–50)
IRON SERPL-MCNC: 83 UG/DL (ref 50–170)
LDLC SERPL CALC-MCNC: 104 MG/DL (ref ?–100)
LYMPHOCYTES # BLD AUTO: 2.94 X10(3) UL (ref 1–4)
LYMPHOCYTES NFR BLD AUTO: 57 %
MCH RBC QN AUTO: 28.2 PG (ref 26–34)
MCHC RBC AUTO-ENTMCNC: 32 G/DL (ref 31–37)
MCV RBC AUTO: 88.1 FL (ref 80–100)
MONOCYTES # BLD AUTO: 0.41 X10(3) UL (ref 0.1–1)
MONOCYTES NFR BLD AUTO: 7.9 %
NEUTROPHILS # BLD AUTO: 1.65 X10 (3) UL (ref 1.5–7.7)
NEUTROPHILS # BLD AUTO: 1.65 X10(3) UL (ref 1.5–7.7)
NEUTROPHILS NFR BLD AUTO: 32 %
NONHDLC SERPL-MCNC: 112 MG/DL (ref ?–130)
OSMOLALITY SERPL CALC.SUM OF ELEC: 283 MOSM/KG (ref 275–295)
PATIENT FASTING Y/N/NP: YES
PATIENT FASTING Y/N/NP: YES
PLATELET # BLD AUTO: 281 10(3)UL (ref 150–450)
POTASSIUM SERPL-SCNC: 4 MMOL/L (ref 3.5–5.1)
RBC # BLD AUTO: 4.61 X10(6)UL (ref 3.8–5.3)
RH BLOOD TYPE: POSITIVE
SODIUM SERPL-SCNC: 137 MMOL/L (ref 136–145)
T3FREE SERPL-MCNC: 2.28 PG/ML (ref 2.4–4.2)
T4 FREE SERPL-MCNC: 1.1 NG/DL (ref 0.8–1.7)
TOTAL IRON BINDING CAPACITY: 317 UG/DL (ref 240–450)
TRANSFERRIN SERPL-MCNC: 213 MG/DL (ref 200–360)
TRIGL SERPL-MCNC: 40 MG/DL (ref 30–149)
TSI SER-ACNC: 0.35 MIU/ML (ref 0.36–3.74)
VLDLC SERPL CALC-MCNC: 8 MG/DL (ref 0–30)
WBC # BLD AUTO: 5.2 X10(3) UL (ref 4–11)

## 2020-02-18 PROCEDURE — 84481 FREE ASSAY (FT-3): CPT | Performed by: FAMILY MEDICINE

## 2020-02-18 PROCEDURE — 83036 HEMOGLOBIN GLYCOSYLATED A1C: CPT | Performed by: FAMILY MEDICINE

## 2020-02-18 PROCEDURE — 87081 CULTURE SCREEN ONLY: CPT

## 2020-02-18 PROCEDURE — 80061 LIPID PANEL: CPT | Performed by: FAMILY MEDICINE

## 2020-02-18 PROCEDURE — 84439 ASSAY OF FREE THYROXINE: CPT | Performed by: FAMILY MEDICINE

## 2020-02-18 PROCEDURE — 83540 ASSAY OF IRON: CPT | Performed by: FAMILY MEDICINE

## 2020-02-18 PROCEDURE — 80048 BASIC METABOLIC PNL TOTAL CA: CPT | Performed by: FAMILY MEDICINE

## 2020-02-18 PROCEDURE — 86900 BLOOD TYPING SEROLOGIC ABO: CPT

## 2020-02-18 PROCEDURE — 85025 COMPLETE CBC W/AUTO DIFF WBC: CPT | Performed by: FAMILY MEDICINE

## 2020-02-18 PROCEDURE — 84443 ASSAY THYROID STIM HORMONE: CPT | Performed by: FAMILY MEDICINE

## 2020-02-18 PROCEDURE — 83550 IRON BINDING TEST: CPT | Performed by: FAMILY MEDICINE

## 2020-02-18 PROCEDURE — 86850 RBC ANTIBODY SCREEN: CPT

## 2020-02-18 PROCEDURE — 82728 ASSAY OF FERRITIN: CPT | Performed by: FAMILY MEDICINE

## 2020-02-18 PROCEDURE — 86901 BLOOD TYPING SEROLOGIC RH(D): CPT

## 2020-02-18 PROCEDURE — 36415 COLL VENOUS BLD VENIPUNCTURE: CPT | Performed by: FAMILY MEDICINE

## 2020-02-18 NOTE — TELEPHONE ENCOUNTER
Faxed pre-op HXP, EKG, BMP, CBC to Dr. Karen Leong at [phone]. Patient notified that this has been completed.

## 2020-02-19 ENCOUNTER — OFFICE VISIT (OUTPATIENT)
Dept: FAMILY MEDICINE CLINIC | Facility: CLINIC | Age: 45
End: 2020-02-19
Payer: COMMERCIAL

## 2020-02-19 VITALS
HEART RATE: 80 BPM | TEMPERATURE: 99 F | SYSTOLIC BLOOD PRESSURE: 112 MMHG | HEIGHT: 64 IN | RESPIRATION RATE: 18 BRPM | WEIGHT: 192.19 LBS | BODY MASS INDEX: 32.81 KG/M2 | OXYGEN SATURATION: 99 % | DIASTOLIC BLOOD PRESSURE: 78 MMHG

## 2020-02-19 DIAGNOSIS — R79.89 ABNORMAL TSH: ICD-10-CM

## 2020-02-19 DIAGNOSIS — E78.2 MIXED HYPERLIPIDEMIA: ICD-10-CM

## 2020-02-19 DIAGNOSIS — D50.9 IRON DEFICIENCY ANEMIA, UNSPECIFIED IRON DEFICIENCY ANEMIA TYPE: Primary | ICD-10-CM

## 2020-02-19 PROCEDURE — 99214 OFFICE O/P EST MOD 30 MIN: CPT | Performed by: FAMILY MEDICINE

## 2020-02-19 NOTE — PATIENT INSTRUCTIONS
As of October 6th 2014, the Drug Enforcement Agency [facility]) is reclassifying all hydrocodone combination medications from Schedule III to Schedule II. This includes medications such as Norco, Vicodin, Lortab, Zohydro, and Vicoprofen.      What this means for this test?  This is a blood test that measures your level of thyroid stimulating hormone (TSH). Healthcare providers use this test to diagnose problems affecting the thyroid.   Your thyroid is a butterfly-shaped gland near the base of your throat above your thyroiditis  What do my test results mean? Test results may vary depending on your age, gender, health history, the method used for the test, and other things. Your test results may not mean you have a problem.  Ask your healthcare provider what your test

## 2020-02-20 NOTE — PROGRESS NOTES
CHIEF COMPLAINT: Patient presents with: Follow - Up: discuss labs        HPI:     Shruthi Ron is a 53year old female presents for lab results f-u. Iron deficiency anemia f-u: her recent labs show that her Hgb increased to normal range.  She is sti Outpatient Medications   Medication Sig Dispense Refill   • AmLODIPine Besylate 5 MG Oral Tab Take 1 tablet by mouth once daily. 3   • Cholecalciferol (VITAMIN D) 2000 UNITS Oral Tab Take 2,000 Units by mouth daily.        • Losartan Potassium (COZAAR) 50 regular rhythm and normal heart sounds. Pulmonary/Chest: Effort normal and breath sounds normal.   Lymphadenopathy:     She has no cervical adenopathy. Neurological: She is alert and oriented to person, place, and time. Skin: Skin is warm and dry. 02/18/2020 2.94    • Monocyte Absolute 02/18/2020 0.41    • Eosinophil Absolute 02/18/2020 0.10    • Basophil Absolute 02/18/2020 0.05    • Immature Granulocyte Abs* 02/18/2020 0.01    • Neutrophil % 02/18/2020 32.0    • Lymphocyte % 02/18/2020 57.0    • M knee     Osteoarthritis of left hip  Global 05/01/2019     History of kidney cancer     Pes planus of both feet     Status post total replacement of left hip     Greater trochanteric bursitis, left     Primary osteoarthritis of right hip     Essential hype

## 2020-02-26 LAB
T3, FREE: 2.7 PG/ML (ref 2.3–4.2)
T4, FREE: 1.2 NG/DL (ref 0.8–1.8)
TSH: 0.46 MIU/L

## 2020-03-05 ENCOUNTER — ANESTHESIA (OUTPATIENT)
Dept: SURGERY | Facility: HOSPITAL | Age: 45
DRG: 470 | End: 2020-03-05
Payer: COMMERCIAL

## 2020-03-05 ENCOUNTER — APPOINTMENT (OUTPATIENT)
Dept: GENERAL RADIOLOGY | Facility: HOSPITAL | Age: 45
DRG: 470 | End: 2020-03-05
Attending: ORTHOPAEDIC SURGERY
Payer: COMMERCIAL

## 2020-03-05 ENCOUNTER — HOSPITAL ENCOUNTER (INPATIENT)
Facility: HOSPITAL | Age: 45
LOS: 1 days | Discharge: HOME HEALTH | DRG: 470 | End: 2020-03-06
Attending: ORTHOPAEDIC SURGERY | Admitting: ORTHOPAEDIC SURGERY
Payer: COMMERCIAL

## 2020-03-05 ENCOUNTER — ANESTHESIA EVENT (OUTPATIENT)
Dept: SURGERY | Facility: HOSPITAL | Age: 45
DRG: 470 | End: 2020-03-05
Payer: COMMERCIAL

## 2020-03-05 DIAGNOSIS — M16.11 PRIMARY OSTEOARTHRITIS OF RIGHT HIP: Primary | ICD-10-CM

## 2020-03-05 LAB
CREAT BLD-MCNC: 0.92 MG/DL (ref 0.55–1.02)
POCT LOT NUMBER: NORMAL
POCT URINE PREGNANCY: NEGATIVE

## 2020-03-05 PROCEDURE — 3E0T3BZ INTRODUCTION OF ANESTHETIC AGENT INTO PERIPHERAL NERVES AND PLEXI, PERCUTANEOUS APPROACH: ICD-10-PCS | Performed by: ANESTHESIOLOGY

## 2020-03-05 PROCEDURE — S0077 INJECTION, CLINDAMYCIN PHOSP: HCPCS | Performed by: ANESTHESIOLOGY

## 2020-03-05 PROCEDURE — 99233 SBSQ HOSP IP/OBS HIGH 50: CPT | Performed by: HOSPITALIST

## 2020-03-05 PROCEDURE — 73501 X-RAY EXAM HIP UNI 1 VIEW: CPT | Performed by: ORTHOPAEDIC SURGERY

## 2020-03-05 PROCEDURE — 0SR904A REPLACEMENT OF RIGHT HIP JOINT WITH CERAMIC ON POLYETHYLENE SYNTHETIC SUBSTITUTE, UNCEMENTED, OPEN APPROACH: ICD-10-PCS | Performed by: ORTHOPAEDIC SURGERY

## 2020-03-05 DEVICE — BIOLOX DELTA CERAMIC FEMORAL HEAD 32MM DIA +1 12/14 TAPER
Type: IMPLANTABLE DEVICE | Site: HIP | Status: FUNCTIONAL
Brand: BIOLOX DELTA

## 2020-03-05 DEVICE — PINNACLE HIP SOLUTIONS ALTRX POLYETHYLENE ACETABULAR LINER +4 NEUTRAL 32MM ID 50MM OD
Type: IMPLANTABLE DEVICE | Site: HIP | Status: FUNCTIONAL
Brand: PINNACLE ALTRX

## 2020-03-05 DEVICE — APEX HOLE ELIMINATOR - PS
Type: IMPLANTABLE DEVICE | Site: HIP | Status: FUNCTIONAL
Brand: APEX

## 2020-03-05 DEVICE — PINNACLE 100 ACETABULAR SHELL GRIPTION 100 50MM OD
Type: IMPLANTABLE DEVICE | Site: HIP | Status: FUNCTIONAL
Brand: PINNACLE GRIPTION

## 2020-03-05 DEVICE — CORAIL HIP SYSTEM CEMENTLESS FEMORAL STEM 12/14 AMT 135 DEGREES KS SIZE 10 HA COATED STANDARD NO COLLAR
Type: IMPLANTABLE DEVICE | Site: HIP | Status: FUNCTIONAL
Brand: CORAIL

## 2020-03-05 RX ORDER — AMLODIPINE BESYLATE 5 MG/1
5 TABLET ORAL DAILY
Status: DISCONTINUED | OUTPATIENT
Start: 2020-03-06 | End: 2020-03-06

## 2020-03-05 RX ORDER — ACETAMINOPHEN 325 MG/1
TABLET ORAL
Status: COMPLETED
Start: 2020-03-05 | End: 2020-03-05

## 2020-03-05 RX ORDER — CLINDAMYCIN PHOSPHATE 900 MG/50ML
INJECTION INTRAVENOUS
Status: DISPENSED
Start: 2020-03-05 | End: 2020-03-05

## 2020-03-05 RX ORDER — ONDANSETRON 2 MG/ML
4 INJECTION INTRAMUSCULAR; INTRAVENOUS EVERY 4 HOURS PRN
Status: DISCONTINUED | OUTPATIENT
Start: 2020-03-05 | End: 2020-03-06

## 2020-03-05 RX ORDER — MELATONIN
325
Status: DISCONTINUED | OUTPATIENT
Start: 2020-03-06 | End: 2020-03-06

## 2020-03-05 RX ORDER — DOCUSATE SODIUM 100 MG/1
100 CAPSULE, LIQUID FILLED ORAL 2 TIMES DAILY
Status: DISCONTINUED | OUTPATIENT
Start: 2020-03-05 | End: 2020-03-06

## 2020-03-05 RX ORDER — HYDROMORPHONE HYDROCHLORIDE 1 MG/ML
0.8 INJECTION, SOLUTION INTRAMUSCULAR; INTRAVENOUS; SUBCUTANEOUS EVERY 2 HOUR PRN
Status: DISCONTINUED | OUTPATIENT
Start: 2020-03-05 | End: 2020-03-06

## 2020-03-05 RX ORDER — OXYCODONE HYDROCHLORIDE 5 MG/1
5 TABLET ORAL EVERY 4 HOURS PRN
Status: DISCONTINUED | OUTPATIENT
Start: 2020-03-05 | End: 2020-03-06

## 2020-03-05 RX ORDER — ZOLPIDEM TARTRATE 5 MG/1
5 TABLET ORAL NIGHTLY PRN
Status: DISCONTINUED | OUTPATIENT
Start: 2020-03-05 | End: 2020-03-06

## 2020-03-05 RX ORDER — SODIUM CHLORIDE, SODIUM LACTATE, POTASSIUM CHLORIDE, CALCIUM CHLORIDE 600; 310; 30; 20 MG/100ML; MG/100ML; MG/100ML; MG/100ML
INJECTION, SOLUTION INTRAVENOUS CONTINUOUS
Status: DISCONTINUED | OUTPATIENT
Start: 2020-03-05 | End: 2020-03-06

## 2020-03-05 RX ORDER — LOSARTAN POTASSIUM 50 MG/1
50 TABLET ORAL DAILY
Status: DISCONTINUED | OUTPATIENT
Start: 2020-03-05 | End: 2020-03-06

## 2020-03-05 RX ORDER — MIDAZOLAM HYDROCHLORIDE 1 MG/ML
INJECTION INTRAMUSCULAR; INTRAVENOUS AS NEEDED
Status: DISCONTINUED | OUTPATIENT
Start: 2020-03-05 | End: 2020-03-05 | Stop reason: SURG

## 2020-03-05 RX ORDER — BUPIVACAINE HYDROCHLORIDE 7.5 MG/ML
INJECTION, SOLUTION INTRASPINAL AS NEEDED
Status: DISCONTINUED | OUTPATIENT
Start: 2020-03-05 | End: 2020-03-05 | Stop reason: SURG

## 2020-03-05 RX ORDER — ACETAMINOPHEN 500 MG
1000 TABLET ORAL ONCE AS NEEDED
Status: DISCONTINUED | OUTPATIENT
Start: 2020-03-05 | End: 2020-03-05 | Stop reason: HOSPADM

## 2020-03-05 RX ORDER — KETOROLAC TROMETHAMINE 30 MG/ML
30 INJECTION, SOLUTION INTRAMUSCULAR; INTRAVENOUS EVERY 6 HOURS PRN
Status: DISCONTINUED | OUTPATIENT
Start: 2020-03-05 | End: 2020-03-05 | Stop reason: HOSPADM

## 2020-03-05 RX ORDER — NALOXONE HYDROCHLORIDE 0.4 MG/ML
80 INJECTION, SOLUTION INTRAMUSCULAR; INTRAVENOUS; SUBCUTANEOUS AS NEEDED
Status: DISCONTINUED | OUTPATIENT
Start: 2020-03-05 | End: 2020-03-05 | Stop reason: HOSPADM

## 2020-03-05 RX ORDER — CEFAZOLIN SODIUM 1 G/3ML
INJECTION, POWDER, FOR SOLUTION INTRAMUSCULAR; INTRAVENOUS AS NEEDED
Status: DISCONTINUED | OUTPATIENT
Start: 2020-03-05 | End: 2020-03-05 | Stop reason: SURG

## 2020-03-05 RX ORDER — OXYCODONE HYDROCHLORIDE 10 MG/1
10 TABLET ORAL EVERY 4 HOURS PRN
Status: DISCONTINUED | OUTPATIENT
Start: 2020-03-05 | End: 2020-03-06

## 2020-03-05 RX ORDER — ACETAMINOPHEN 325 MG/1
650 TABLET ORAL 4 TIMES DAILY
Status: DISCONTINUED | OUTPATIENT
Start: 2020-03-05 | End: 2020-03-06

## 2020-03-05 RX ORDER — MIDAZOLAM HYDROCHLORIDE 1 MG/ML
1 INJECTION INTRAMUSCULAR; INTRAVENOUS EVERY 5 MIN PRN
Status: DISCONTINUED | OUTPATIENT
Start: 2020-03-05 | End: 2020-03-05 | Stop reason: HOSPADM

## 2020-03-05 RX ORDER — KETOROLAC TROMETHAMINE 30 MG/ML
30 INJECTION, SOLUTION INTRAMUSCULAR; INTRAVENOUS EVERY 6 HOURS
Status: COMPLETED | OUTPATIENT
Start: 2020-03-05 | End: 2020-03-06

## 2020-03-05 RX ORDER — MEPERIDINE HYDROCHLORIDE 25 MG/ML
25 INJECTION INTRAMUSCULAR; INTRAVENOUS; SUBCUTANEOUS
Status: COMPLETED | OUTPATIENT
Start: 2020-03-05 | End: 2020-03-05

## 2020-03-05 RX ORDER — DIPHENHYDRAMINE HYDROCHLORIDE 50 MG/ML
25 INJECTION INTRAMUSCULAR; INTRAVENOUS ONCE AS NEEDED
Status: ACTIVE | OUTPATIENT
Start: 2020-03-05 | End: 2020-03-05

## 2020-03-05 RX ORDER — ONDANSETRON 2 MG/ML
INJECTION INTRAMUSCULAR; INTRAVENOUS
Status: COMPLETED
Start: 2020-03-05 | End: 2020-03-05

## 2020-03-05 RX ORDER — SCOLOPAMINE TRANSDERMAL SYSTEM 1 MG/1
PATCH, EXTENDED RELEASE TRANSDERMAL
Status: DISPENSED
Start: 2020-03-05 | End: 2020-03-05

## 2020-03-05 RX ORDER — METOCLOPRAMIDE HYDROCHLORIDE 5 MG/ML
10 INJECTION INTRAMUSCULAR; INTRAVENOUS AS NEEDED
Status: DISCONTINUED | OUTPATIENT
Start: 2020-03-05 | End: 2020-03-05 | Stop reason: HOSPADM

## 2020-03-05 RX ORDER — OXYCODONE HYDROCHLORIDE 15 MG/1
15 TABLET ORAL EVERY 4 HOURS PRN
Status: DISCONTINUED | OUTPATIENT
Start: 2020-03-05 | End: 2020-03-06

## 2020-03-05 RX ORDER — HYDROMORPHONE HYDROCHLORIDE 1 MG/ML
0.2 INJECTION, SOLUTION INTRAMUSCULAR; INTRAVENOUS; SUBCUTANEOUS EVERY 2 HOUR PRN
Status: DISCONTINUED | OUTPATIENT
Start: 2020-03-05 | End: 2020-03-06

## 2020-03-05 RX ORDER — BISACODYL 10 MG
10 SUPPOSITORY, RECTAL RECTAL
Status: DISCONTINUED | OUTPATIENT
Start: 2020-03-05 | End: 2020-03-06

## 2020-03-05 RX ORDER — CEFAZOLIN SODIUM/WATER 2 G/20 ML
2 SYRINGE (ML) INTRAVENOUS EVERY 8 HOURS
Status: COMPLETED | OUTPATIENT
Start: 2020-03-05 | End: 2020-03-06

## 2020-03-05 RX ORDER — ACETAMINOPHEN 500 MG
1000 TABLET ORAL ONCE
Status: DISCONTINUED | OUTPATIENT
Start: 2020-03-05 | End: 2020-03-05

## 2020-03-05 RX ORDER — HYDROMORPHONE HYDROCHLORIDE 1 MG/ML
0.4 INJECTION, SOLUTION INTRAMUSCULAR; INTRAVENOUS; SUBCUTANEOUS EVERY 2 HOUR PRN
Status: DISCONTINUED | OUTPATIENT
Start: 2020-03-05 | End: 2020-03-06

## 2020-03-05 RX ORDER — CLINDAMYCIN PHOSPHATE 900 MG/50ML
900 INJECTION INTRAVENOUS ONCE
Status: DISCONTINUED | OUTPATIENT
Start: 2020-03-05 | End: 2020-03-05 | Stop reason: HOSPADM

## 2020-03-05 RX ORDER — CLINDAMYCIN PHOSPHATE 600 MG/50ML
INJECTION INTRAVENOUS AS NEEDED
Status: DISCONTINUED | OUTPATIENT
Start: 2020-03-05 | End: 2020-03-05 | Stop reason: SURG

## 2020-03-05 RX ORDER — PROCHLORPERAZINE EDISYLATE 5 MG/ML
10 INJECTION INTRAMUSCULAR; INTRAVENOUS EVERY 6 HOURS PRN
Status: DISCONTINUED | OUTPATIENT
Start: 2020-03-05 | End: 2020-03-06

## 2020-03-05 RX ORDER — DIPHENHYDRAMINE HYDROCHLORIDE 50 MG/ML
12.5 INJECTION INTRAMUSCULAR; INTRAVENOUS EVERY 4 HOURS PRN
Status: DISCONTINUED | OUTPATIENT
Start: 2020-03-05 | End: 2020-03-06

## 2020-03-05 RX ORDER — ONDANSETRON 2 MG/ML
4 INJECTION INTRAMUSCULAR; INTRAVENOUS AS NEEDED
Status: DISCONTINUED | OUTPATIENT
Start: 2020-03-05 | End: 2020-03-05 | Stop reason: HOSPADM

## 2020-03-05 RX ORDER — TIZANIDINE 4 MG/1
4 TABLET ORAL 3 TIMES DAILY PRN
Status: DISCONTINUED | OUTPATIENT
Start: 2020-03-05 | End: 2020-03-06

## 2020-03-05 RX ORDER — POLYETHYLENE GLYCOL 3350 17 G/17G
17 POWDER, FOR SOLUTION ORAL DAILY PRN
Status: DISCONTINUED | OUTPATIENT
Start: 2020-03-05 | End: 2020-03-06

## 2020-03-05 RX ORDER — DEXAMETHASONE SODIUM PHOSPHATE 4 MG/ML
4 VIAL (ML) INJECTION AS NEEDED
Status: DISCONTINUED | OUTPATIENT
Start: 2020-03-05 | End: 2020-03-05 | Stop reason: HOSPADM

## 2020-03-05 RX ORDER — MEPERIDINE HYDROCHLORIDE 25 MG/ML
INJECTION INTRAMUSCULAR; INTRAVENOUS; SUBCUTANEOUS
Status: COMPLETED
Start: 2020-03-05 | End: 2020-03-05

## 2020-03-05 RX ORDER — SCOLOPAMINE TRANSDERMAL SYSTEM 1 MG/1
1 PATCH, EXTENDED RELEASE TRANSDERMAL ONCE
Status: DISCONTINUED | OUTPATIENT
Start: 2020-03-05 | End: 2020-03-06

## 2020-03-05 RX ORDER — SODIUM CHLORIDE, SODIUM LACTATE, POTASSIUM CHLORIDE, CALCIUM CHLORIDE 600; 310; 30; 20 MG/100ML; MG/100ML; MG/100ML; MG/100ML
INJECTION, SOLUTION INTRAVENOUS CONTINUOUS
Status: DISCONTINUED | OUTPATIENT
Start: 2020-03-05 | End: 2020-03-05 | Stop reason: HOSPADM

## 2020-03-05 RX ORDER — KETOROLAC TROMETHAMINE 30 MG/ML
INJECTION, SOLUTION INTRAMUSCULAR; INTRAVENOUS
Status: COMPLETED
Start: 2020-03-05 | End: 2020-03-05

## 2020-03-05 RX ORDER — METOCLOPRAMIDE HYDROCHLORIDE 5 MG/ML
10 INJECTION INTRAMUSCULAR; INTRAVENOUS EVERY 6 HOURS PRN
Status: DISCONTINUED | OUTPATIENT
Start: 2020-03-05 | End: 2020-03-06

## 2020-03-05 RX ORDER — HYDROMORPHONE HYDROCHLORIDE 1 MG/ML
0.5 INJECTION, SOLUTION INTRAMUSCULAR; INTRAVENOUS; SUBCUTANEOUS EVERY 5 MIN PRN
Status: DISCONTINUED | OUTPATIENT
Start: 2020-03-05 | End: 2020-03-05 | Stop reason: HOSPADM

## 2020-03-05 RX ORDER — SODIUM PHOSPHATE, DIBASIC AND SODIUM PHOSPHATE, MONOBASIC 7; 19 G/133ML; G/133ML
1 ENEMA RECTAL ONCE AS NEEDED
Status: DISCONTINUED | OUTPATIENT
Start: 2020-03-05 | End: 2020-03-06

## 2020-03-05 RX ORDER — DIPHENHYDRAMINE HCL 25 MG
25 CAPSULE ORAL EVERY 4 HOURS PRN
Status: DISCONTINUED | OUTPATIENT
Start: 2020-03-05 | End: 2020-03-06

## 2020-03-05 RX ORDER — KETOROLAC TROMETHAMINE 30 MG/ML
15 INJECTION, SOLUTION INTRAMUSCULAR; INTRAVENOUS EVERY 6 HOURS PRN
Status: DISCONTINUED | OUTPATIENT
Start: 2020-03-05 | End: 2020-03-05 | Stop reason: HOSPADM

## 2020-03-05 RX ORDER — SODIUM CHLORIDE 9 MG/ML
INJECTION, SOLUTION INTRAVENOUS CONTINUOUS
Status: DISCONTINUED | OUTPATIENT
Start: 2020-03-05 | End: 2020-03-06

## 2020-03-05 RX ADMIN — MIDAZOLAM HYDROCHLORIDE 2 MG: 1 INJECTION INTRAMUSCULAR; INTRAVENOUS at 10:10:00

## 2020-03-05 RX ADMIN — BUPIVACAINE HYDROCHLORIDE 1 ML: 7.5 INJECTION, SOLUTION INTRASPINAL at 10:15:00

## 2020-03-05 RX ADMIN — CLINDAMYCIN PHOSPHATE 600 MG: 600 INJECTION INTRAVENOUS at 10:10:00

## 2020-03-05 RX ADMIN — SODIUM CHLORIDE, SODIUM LACTATE, POTASSIUM CHLORIDE, CALCIUM CHLORIDE: 600; 310; 30; 20 INJECTION, SOLUTION INTRAVENOUS at 11:00:00

## 2020-03-05 RX ADMIN — CEFAZOLIN SODIUM 2 G: 1 INJECTION, POWDER, FOR SOLUTION INTRAMUSCULAR; INTRAVENOUS at 10:10:00

## 2020-03-05 NOTE — OPERATIVE REPORT
PATIENT'S NAME: Hinamagali Tran   ATTENDING PHYSICIAN: Precious Pinto MD   OPERATING PHYSICIAN: Precious Pinto MD   PATIENT ACCOUNT#:   [de-identified]    LOCATION:  [address] [place] [address],[address] RECORD #:   [id number]    YOB: 1975  ADMISS aspect of the greater trochanter and down in line with the vastus lateralis.   The tendon was subperiosteally elevated off the anterior aspect of the greater trochanter down to  capsule, which was incised in a T-type fashion, partially excising the capsule was removed. After cleaning the [facility], the final head was impacted in place with good fixation. The final reduction was performed and the hip was put through good range of motion with good leg length and offset and no instability.   The pin was aleksander

## 2020-03-05 NOTE — INTERVAL H&P NOTE
Pre-op Diagnosis: Primary osteoarthritis of right hip [M16.11]    The above referenced H&P was reviewed by Deborah Liu MD on 3/5/2020, the patient was examined and no significant changes have occurred in the patient's condition since the H&P was perfo

## 2020-03-05 NOTE — PLAN OF CARE
Post op plan of care discussed with patient at bedside. Goals discussed. Safety precautions discussed.    Problem: Patient/Family Goals  Goal: Patient/Family Long Term Goal  Description  Patient's Long Term Goal: \"Be able to not have horrible pain and be a environment to reduce risk of injury  - Provide assistive devices as appropriate  - Consider OT/PT consult to assist with strengthening/mobility  - Encourage toileting schedule  Outcome: Progressing

## 2020-03-05 NOTE — H&P
Germania Balbuena   2/12/2020 5:20 PM   Office Visit   MRN:  [id number]   Description: 53year old female Provider: Benita Arriaga MD Department: [facility] [address] [facility]   Scanning Cover Sheet     Click to print Kwan Circe 852 for scanning Anemia: Pt was noted to be treated for anemia, she admits to not taking the Ferrous Sulfate daily as Rx'd. She has some SEs of constipation, but takes a stool softener as needed. She has no dizziness, no lightheadedness, no SOB.  Her last Hgb on 2/1/20 was • Cholecalciferol (VITAMIN D) 2000 UNITS Oral Tab Take 2,000 Units by mouth daily.         • Losartan Potassium (COZAAR) 50 MG Oral Tab Take  by mouth daily.             Allergies:     Ampicillin              HIVES     PS[facility] elements reviewed from today and Pathologist:           Tip Rodríguez MD                                                             Specimen:    Hip, left, FEMORAL HEAD                                                                    • Final Diagnosis: 01/31/2019                      V - labs ordered, await results  - H&P will be faxed to Dr. William Wooten with EKG and lab results     - ELECTROCARDIOGRAM, COMPLETE  - CBC WITH DIFFERENTIAL WITH PLATELET; Future  - BASIC METABOLIC PANEL (8);  Future     2. Primary osteoarthritis of right hip  Se History of kidney cancer     Pes planus of both feet     Status post total replacement of left hip     Greater trochanteric bursitis, left     Primary osteoarthritis of right hip     Essential hypertension, benign        Imaging & Referrals:  Jenae Mclaughlin · Reduce sodium (salt) intake. Eating too much salt may increase your blood pressure. Limit your sodium intake to 2,300 milligrams (mg) per day (the amount in 1 teaspoon of salt), or less if your healthcare provider recommends it.  Dining out less often and Healthy eating starts at the grocery store. Be sure to pay attention to food labels on packaged foods. Look for products that are high in fiber and protein, and low in saturated fat, cholesterol, and sodium. Avoid products that contain trans fat.  And pay c Electronically signed by Collette Boring, MD on 2/12/20 at 6:17 PM

## 2020-03-05 NOTE — ANESTHESIA PREPROCEDURE EVALUATION
PRE-OP EVALUATION    Patient Name: Keyla Holland    Pre-op Diagnosis: Primary osteoarthritis of right hip [M16.11]    Procedure(s):  RIGHT TOTAL HIP ARTHROPLASTY    Surgeon(s) and Role:     Eva Catalan MD - Primary    Pre-op vitals reviewed.   Te (-) angina     (-) OLSON  (-) orthopnea  (-) PND     Endo/Other                           (+) arthritis       Pulmonary    Negative pulmonary ROS.  (-) asthma           (-) recent URI          Neuro/Psych    Negative neuro/psych ROS.      (-) CVA benefits discussed. Questions answered.     Plan/risks discussed with: patient and mother                Present on Admission:  **None**

## 2020-03-05 NOTE — PHYSICAL THERAPY NOTE
Attempted to see for PT evaluation, pt recd sitting up in recliner. Pt cont with numbness right buttocks, and unable to perform quality right quad set, unable to perform right SAQ, not yet appropriate for full PT evaluation. Will re attempt 3/6/2020.

## 2020-03-05 NOTE — ANESTHESIA PROCEDURE NOTES
Spinal Block  Performed by: Justin Franco MD  Authorized by: Justin Franco MD       General Information and Staff    Start Time:  3/5/2020 10:11 AM  End Time:  3/5/2020 10:15 AM  Anesthesiologist:  Jaciel Hoyt MD  Performed by:   Anesthesiologist  P

## 2020-03-05 NOTE — BRIEF OP NOTE
Pre-Operative Diagnosis: Primary osteoarthritis of right hip [M16.11]     Post-Operative Diagnosis: Primary osteoarthritis of right hip [M16.11]      Procedure Performed:   Procedure(s):  RIGHT TOTAL HIP ARTHROPLASTY    Surgeon(s) and Role:     Chris Hendricks,

## 2020-03-05 NOTE — ANESTHESIA PROCEDURE NOTES
Regional Block  Performed by: Latha Franco MD  Authorized by: Latha Franco MD       General Information and Staff    Start Time:  3/5/2020 10:16 AM  End Time:  3/5/2020 10:20 AM  Anesthesiologist:  Jayant Pelayo MD  Performed by:   Anesthesiologist

## 2020-03-05 NOTE — ANESTHESIA POSTPROCEDURE EVALUATION
Phillip 2484 Patient Status:  Surgery Admit - Inpt   Age/Gender 53year old female MRN [id number]   [facility] SURGERY Attending Krissy Mock MD   Hosp Day # 0 PCP Demetria Falcon MD       Anesthesia Post-op

## 2020-03-06 ENCOUNTER — APPOINTMENT (OUTPATIENT)
Dept: GENERAL RADIOLOGY | Facility: HOSPITAL | Age: 45
DRG: 470 | End: 2020-03-06
Attending: PHYSICIAN ASSISTANT
Payer: COMMERCIAL

## 2020-03-06 VITALS
TEMPERATURE: 98 F | RESPIRATION RATE: 16 BRPM | HEART RATE: 66 BPM | DIASTOLIC BLOOD PRESSURE: 55 MMHG | SYSTOLIC BLOOD PRESSURE: 114 MMHG | BODY MASS INDEX: 31.96 KG/M2 | HEIGHT: 64 IN | OXYGEN SATURATION: 100 % | WEIGHT: 187.19 LBS

## 2020-03-06 PROBLEM — Z47.89 ORTHOPEDIC AFTERCARE: Status: ACTIVE | Noted: 2020-03-06

## 2020-03-06 LAB
ANION GAP SERPL CALC-SCNC: 5 MMOL/L (ref 0–18)
BUN BLD-MCNC: 7 MG/DL (ref 7–18)
BUN/CREAT SERPL: 7.4 (ref 10–20)
CALCIUM BLD-MCNC: 8.3 MG/DL (ref 8.5–10.1)
CHLORIDE SERPL-SCNC: 108 MMOL/L (ref 98–112)
CO2 SERPL-SCNC: 23 MMOL/L (ref 21–32)
CREAT BLD-MCNC: 0.95 MG/DL (ref 0.55–1.02)
DEPRECATED RDW RBC AUTO: 49 FL (ref 35.1–46.3)
ERYTHROCYTE [DISTWIDTH] IN BLOOD BY AUTOMATED COUNT: 15.5 % (ref 11–15)
GLUCOSE BLD-MCNC: 101 MG/DL (ref 70–99)
HCT VFR BLD AUTO: 36.7 % (ref 35–48)
HGB BLD-MCNC: 11.8 G/DL (ref 12–16)
MCH RBC QN AUTO: 27.8 PG (ref 26–34)
MCHC RBC AUTO-ENTMCNC: 32.2 G/DL (ref 31–37)
MCV RBC AUTO: 86.4 FL (ref 80–100)
OSMOLALITY SERPL CALC.SUM OF ELEC: 280 MOSM/KG (ref 275–295)
PLATELET # BLD AUTO: 214 10(3)UL (ref 150–450)
POTASSIUM SERPL-SCNC: 4.3 MMOL/L (ref 3.5–5.1)
RBC # BLD AUTO: 4.25 X10(6)UL (ref 3.8–5.3)
SODIUM SERPL-SCNC: 136 MMOL/L (ref 136–145)
WBC # BLD AUTO: 9.5 X10(3) UL (ref 4–11)

## 2020-03-06 PROCEDURE — 99232 SBSQ HOSP IP/OBS MODERATE 35: CPT | Performed by: HOSPITALIST

## 2020-03-06 RX ORDER — OXYCODONE HYDROCHLORIDE 5 MG/1
5 TABLET ORAL EVERY 4 HOURS PRN
Status: DISCONTINUED | OUTPATIENT
Start: 2020-03-06 | End: 2020-03-06

## 2020-03-06 RX ORDER — HYDROCODONE BITARTRATE AND ACETAMINOPHEN 10; 325 MG/1; MG/1
1 TABLET ORAL EVERY 4 HOURS PRN
Status: DISCONTINUED | OUTPATIENT
Start: 2020-03-06 | End: 2020-03-06

## 2020-03-06 RX ORDER — HYDROCODONE BITARTRATE AND ACETAMINOPHEN 10; 325 MG/1; MG/1
2 TABLET ORAL EVERY 4 HOURS PRN
Status: DISCONTINUED | OUTPATIENT
Start: 2020-03-06 | End: 2020-03-06

## 2020-03-06 RX ORDER — ACETAMINOPHEN 325 MG/1
325 TABLET ORAL EVERY 4 HOURS PRN
Status: DISCONTINUED | OUTPATIENT
Start: 2020-03-06 | End: 2020-03-06

## 2020-03-06 RX ORDER — ACETAMINOPHEN 325 MG/1
650 TABLET ORAL EVERY 4 HOURS PRN
Status: DISCONTINUED | OUTPATIENT
Start: 2020-03-06 | End: 2020-03-06

## 2020-03-06 RX ORDER — TIZANIDINE 2 MG/1
2 TABLET ORAL 3 TIMES DAILY PRN
Status: DISCONTINUED | OUTPATIENT
Start: 2020-03-06 | End: 2020-03-06

## 2020-03-06 NOTE — PLAN OF CARE
Patient up with min- mod assist, 2 person for safety with walker. Ambulates to BR. Voided. Pain controlled with PO and scheduled meds at this time.    PT/OT to claudia in am.

## 2020-03-06 NOTE — PROGRESS NOTES
Patient arrived on unit s/p RTHA. Dressing CDI. Pt anxious to urinate on arrival, Up with RN to bathroom. Patient can be impulsive, 2 person assist for safety at this time. PT/Beto taylor in am.   Ice pack to hip. Dr Marlene Nixon on unit to see for consult.

## 2020-03-06 NOTE — OCCUPATIONAL THERAPY NOTE
OCCUPATIONAL THERAPY QUICK EVALUATION - INPATIENT    Room Number: 368/368-A  Evaluation Date: 3/6/2020     Type of Evaluation: Initial  Presenting Problem: (R GARIMA)    Physician Order: IP Consult to Occupational Therapy  Reason for Therapy:  ADL/IADL Dysfun GARIMA    SUBJECTIVE   pleasant, motivated to regain independence    Patient self-stated goal is to go home today    OBJECTIVE  Precautions: GARIMA - anterior(no active hip abduction)  Fall Risk: Standard fall risk    WEIGHT BEARING RESTRICTION  Weight Bearing R supervision. using  reacher, sock aid. .   OT instructed patient on safe technique for sit to stand, completed with SBA . Pants to waist completed with supervision.   OT educated patient on safe integration of RW for bathroom mobility and toilet transfe Complexity  Occupational Profile/Medical History  LOW - Brief history including review of medical or therapy records    Specific performance deficits impacting engagement in ADL/IADL  LOW  1 - 3 performance deficits    Client Assessment/Performance Deficit

## 2020-03-06 NOTE — CM/SW NOTE
Call from [facility] with [address] [facility] is able to accept pt and plan to start care this weekend.      [facility];  [address] [facility], [address] [facility] [address] [address]

## 2020-03-06 NOTE — CM/SW NOTE
52 yo sp total hip replacement. Post op protocol orders for discharge planning. PT is recommending home health. Pt has [employer] and used [facility] home health after previous joint replacement.      HOME SITUATION  Type of Home: House   Home Layout: One le

## 2020-03-06 NOTE — PLAN OF CARE
Patient passed PT/OT this shift. Pain well controlled with PO medications. Up and ambulating with minimal assist and walker. Jenn White issued prior to depart. Plan to home with home health this afternoon.

## 2020-03-06 NOTE — PHYSICAL THERAPY NOTE
PHYSICAL THERAPY HIP TREATMENT NOTE - INPATIENT      Room Number: 368/368-A     Session: 1&2  Number of Visits to Meet Established Goals: 3    Presenting Problem: s/p right GARIMA 3/5/2020    History related to current admission: Pt admitted 3/5/2020 for elec +  ACTIVITY TOLERANCE                         O2 WALK                  AM-PAC '6-Clicks' INPATIENT SHORT FORM - BASIC MOBILITY  How much difficulty does the patient currently have. ..  -   Turning over in bed (including adjusting bedclothes, sheets and blan 10 reps 15 reps   Glut Sets 10 reps 15 reps   Hip Abd/Add 10 reps 15 reps   Heel slides 10 reps 15 reps   Saq 10 reps 15 reps   Sitting Knee Extension 10 reps 15 reps   Standing heel/toe raises 10 reps 15 reps   Hamstring Curls 10 reps 15 reps   Forward, b

## 2020-03-06 NOTE — PLAN OF CARE
Pt. Admitted for R total hip by Dr. Aj Packer on 03-05-20, Pod 1. Pain level is well controlled. Ambulates with walker and min assist. Incision on R hip is cdi with aquacel.  VS remain stable; SBP to the low 90's after receiving Tizanidine, IVF continued o

## 2020-03-06 NOTE — CONSULTS
[facility]  [address] [place] [address] Patient Status:  Inpatient    1975 MRN [id number]   [facility] 3SW-A Attending Bradley Hernandez MD   Hosp Day # 0 PCP Hammad Reza MD     Reason for consult: Medical manage every 4 (four) hours as needed for Pain., Disp: 30 tablet, Rfl: 0  Diclofenac Sodium 75 MG Oral Tab EC, Take 1 tablet (75 mg total) by mouth 2 (two) times daily. , Disp: 60 tablet, Rfl: 1  AmLODIPine Besylate 5 MG Oral Tab, Take 5 mg by mouth once daily.   , Epic.      ASSESSMENT / PLAN:     1. Osteoarthritis I left hip- S/P left hip arthroplasty. Management per ortho. 2. Hypertension- Will continue on amlodipine and losartan.     Quality:  · DVT Prophylaxis: Eliquis  · CODE status: Full  · Cardona: N/A    Plan

## 2020-03-06 NOTE — CM/SW NOTE
AVS sent via [email or web address] to [facility] Michael Esteban SZelda      03/06/20 1100   Discharge disposition   Expected discharge disposition Home-Health   Name of Facillity/Home Care/Hospice [facility]   Discharge transportation Private car

## 2020-03-06 NOTE — PROGRESS NOTES
[address] [facility]  Orthopedic Surgery  Progress Note    Ranjit Kandis Patient Status:  Inpatient    1975 MRN [id number]   [facility] 3SW-A Attending Marv Stout MD   Hosp Day # 1 PCP Katlin Boss MD     [facility] 03/05/20 1210 (!) 118/96 — — 77 14 100 %   03/05/20 1205 121/90 — — 87 13 100 %   03/05/20 1200 — — — 84 20 100 %   03/05/20 1155 94/82 — — 84 17 91 %   03/05/20 1150 99/88 98.4 °F (36.9 °C) Temporal 90 13 100 %       ORTHO EXAM:  Right lower extremity:

## 2020-03-06 NOTE — PROGRESS NOTES
Patient discharged to home this afternoon. Reviewed all discharge materials at bedside prior to depart all questions answered at this time. Patient verbalizes understanding of all teaching. Barrera Destiney issued prior to depart.    Patient to follow up with home

## 2020-03-06 NOTE — PROGRESS NOTES
[facility]  Progress Note     Keren Tam Patient Status:  Inpatient    1975 MRN [id number]   [facility] 3SW-A Attending Stanley Mars MD   Hosp Day # 1 PCP Warden Mejia MD     Chief Complaint: Gretel Beavers sulfate  325 mg Oral Daily with breakfast   • apixaban  2.5 mg Oral [email or web address]   • losartan Potassium  50 mg Oral Daily   • amLODIPine Besylate  5 mg Oral Daily       ASSESSMENT / PLAN:     1. Osteoarthritis I left hip- S/P left hip arthroplasty.  Manage

## 2020-03-06 NOTE — PHYSICAL THERAPY NOTE
PHYSICAL THERAPY HIP EVALUATION - INPATIENT     Room Number: 368/368-A  Evaluation Date: 3/6/2020  Type of Evaluation: Initial  Physician Order: PT Eval and Treat    Presenting Problem: s/p right GARIMA 3/5/2020  Reason for Therapy: Mobility Dysfunction and D today    OBJECTIVE  Precautions: GARIMA - anterior(no active hip abduction)  Fall Risk: Standard fall risk    WEIGHT BEARING RESTRICTION  Weight Bearing Restriction: R lower extremity        R Lower Extremity: Weight Bearing as Tolerated       PAIN ASSESSMENT therapy, ankle pumps for DVT prevention, anterior hip precautions + no active right hip abduction, goals for session. Pt able to perform ankle pumps, reports return of sensation right LE, able to perform right quad set and SAQ with good quality.   Pt trans independent bed mobility, transfers, and gait. The patient is below baseline and would benefit from skilled inpatient PT to address the above deficits to assist patient in returning to prior to level of function.   DISCHARGE RECOMMENDATIONS  PT Discharge R

## 2020-03-06 NOTE — PROGRESS NOTES
Post Op Day 1 Ortho Note: Right GARIMA    Status Post Nerve Block:  Type of Nerve Block: Right Fascia Iliaca  Single Injection Nerve Block    Post op review: No evidence of immediate block related complications, No paresthesia noted, Able to lift leg(s), Able

## 2020-03-09 NOTE — PAYOR COMM NOTE
--------------  ADMISSION REVIEW     Payor: Ute Haas  #:  [id number]  Authorization Number: [id number]    Admit date: 3/5/20  Admit time: 1448       Admitting Physician: Bradley Hernandez MD  Attending Physician:  No att. providers fou Surgery Date: 3/5/2020     [facility]     Surgeon: Dr. Yang Mcintosh     Pt has a h/o previous surgeries with anesthesia without any complications. She had Lt hip arthroplasty on 1/31/2019 with no problems post-op or with recovery.  Pt does not sm Alcohol/week: 0.0 standard drinks    Drug use:  No         Medications (Active prior to today's visit):         Current Outpatient Medications   Medication Sig Dispense Refill   • Acetaminophen-Codeine #3 300-30 MG Oral Tab Take 1 tablet by mouth every • Procedure Control 01/31/2019 ok    • EXPIRATION DATE 01/31/2019 2,020/5    • Case Report 01/31/2019                      Value:Surgical Pathology                                Case: [id number]                                   Authorizing Provider:  Cari Almeida • PLT 02/02/2019 234.0    • MCV 02/02/2019 87.5    • MCH 02/02/2019 28.6    • MCHC 02/02/2019 32.7    • RDW 02/02/2019 14.8    • RDW-SD 02/02/2019 48.1*      REVIEWED THIS VISIT  ASSESSMENT/PLAN:   53year old female with     1.  Pre-op exam  - pt is low-ri Patient/Caregiver Education: There are no barriers to learning. Medical education done. Outcome: Patient verbalizes understanding and agrees with plan.  Advised to call or RTC if symptoms persist or worsen.     Problem List:   Patient Active Problem List: · Limit saturated fats and trans fats. Saturated fats raise your levels of cholesterol, so keep these fats to a minimum. They are found in foods such as fatty meats, whole milk, cheese, and palm and coconut oils.  Avoid trans fats because they lower good ch · Healthy fats can be good for you in small amounts. These are unsaturated fats, such as olive oil, nuts, and fish. Try to have at least 2 servings per week of fatty fish, such as salmon, sardines, mackerel, rainbow trout, and albacore tuna.  These contain Wt 187 lb 9.6 oz (85.1 kg)    LMP  (LMP Unknown)    SpO2 97%    BMI 32.20 kg/m²    BSA 1.9 m²    Pain Sc 0 - (None)       More Vitals    Flowsheets:    Energy Needs,    MU Functional Status,    PHQ2 Depression Screen,    CAGE Flowsheet,    DMG TEMP FOR BP

## 2020-03-09 NOTE — PAYOR COMM NOTE
--------------  DISCHARGE REVIEW    Payor: Ute Haas  #:  [id number]  Authorization Number: [id number]    Admit date: 3/5/20  Admit time:  1448  Discharge Date: 3/6/2020  3:30 PM     Admitting Physician: Lane Rodriguez MD  Primary Ca

## 2020-03-10 NOTE — PAYOR COMM NOTE
--------------  DISCHARGE REVIEW    Payor: Ute Haas  #:  [id number]  Authorization Number: [id number]    Admit date: 3/5/20  Admit time:  1448  Discharge Date: 3/6/2020  3:30 PM     Admitting Physician: Mikayla Villagran MD  Attending

## 2020-04-14 PROBLEM — M25.551 PAIN IN RIGHT HIP: Status: ACTIVE | Noted: 2020-04-14

## 2020-04-14 PROBLEM — Z96.641 STATUS POST TOTAL HIP REPLACEMENT, RIGHT: Status: ACTIVE | Noted: 2020-04-14

## 2020-04-18 ENCOUNTER — HOSPITAL ENCOUNTER (OUTPATIENT)
Dept: MRI IMAGING | Facility: HOSPITAL | Age: 45
Discharge: HOME | End: 2020-04-18
Attending: ORTHOPAEDIC SURGERY
Payer: COMMERCIAL

## 2020-04-18 DIAGNOSIS — M25.551 PAIN IN RIGHT HIP: ICD-10-CM

## 2020-04-18 PROCEDURE — 73721 MRI JNT OF LWR EXTRE W/O DYE: CPT | Performed by: ORTHOPAEDIC SURGERY

## 2020-04-24 PROBLEM — S76.011D: Status: ACTIVE | Noted: 2020-04-24

## 2020-05-06 ENCOUNTER — ANESTHESIA EVENT (OUTPATIENT)
Dept: SURGERY | Facility: HOSPITAL | Age: 45
End: 2020-05-06
Payer: COMMERCIAL

## 2020-05-07 ENCOUNTER — HOSPITAL ENCOUNTER (OUTPATIENT)
Facility: HOSPITAL | Age: 45
Setting detail: HOSPITAL OUTPATIENT SURGERY
Discharge: HOME | End: 2020-05-07
Attending: ORTHOPAEDIC SURGERY | Admitting: ORTHOPAEDIC SURGERY
Payer: COMMERCIAL

## 2020-05-07 ENCOUNTER — ANESTHESIA (OUTPATIENT)
Dept: SURGERY | Facility: HOSPITAL | Age: 45
End: 2020-05-07
Payer: COMMERCIAL

## 2020-05-07 VITALS
WEIGHT: 189.63 LBS | BODY MASS INDEX: 32.37 KG/M2 | DIASTOLIC BLOOD PRESSURE: 69 MMHG | OXYGEN SATURATION: 100 % | HEART RATE: 69 BPM | SYSTOLIC BLOOD PRESSURE: 128 MMHG | RESPIRATION RATE: 16 BRPM | HEIGHT: 64 IN | TEMPERATURE: 98 F

## 2020-05-07 DIAGNOSIS — Z96.641 STATUS POST RIGHT HIP REPLACEMENT: ICD-10-CM

## 2020-05-07 DIAGNOSIS — S76.011D STRAIN OF GLUTEUS MEDIUS OF RIGHT LOWER EXTREMITY, SUBSEQUENT ENCOUNTER: ICD-10-CM

## 2020-05-07 PROCEDURE — 0LQJ0ZZ REPAIR RIGHT HIP TENDON, OPEN APPROACH: ICD-10-PCS | Performed by: ORTHOPAEDIC SURGERY

## 2020-05-07 PROCEDURE — 81025 URINE PREGNANCY TEST: CPT | Performed by: ORTHOPAEDIC SURGERY

## 2020-05-07 RX ORDER — HYDROCODONE BITARTRATE AND ACETAMINOPHEN 10; 325 MG/1; MG/1
1 TABLET ORAL AS NEEDED
Status: DISCONTINUED | OUTPATIENT
Start: 2020-05-07 | End: 2020-05-07

## 2020-05-07 RX ORDER — ROCURONIUM BROMIDE 10 MG/ML
INJECTION, SOLUTION INTRAVENOUS AS NEEDED
Status: DISCONTINUED | OUTPATIENT
Start: 2020-05-07 | End: 2020-05-07 | Stop reason: SURG

## 2020-05-07 RX ORDER — NEOSTIGMINE METHYLSULFATE 1 MG/ML
INJECTION INTRAVENOUS AS NEEDED
Status: DISCONTINUED | OUTPATIENT
Start: 2020-05-07 | End: 2020-05-07 | Stop reason: SURG

## 2020-05-07 RX ORDER — SODIUM CHLORIDE, SODIUM LACTATE, POTASSIUM CHLORIDE, CALCIUM CHLORIDE 600; 310; 30; 20 MG/100ML; MG/100ML; MG/100ML; MG/100ML
INJECTION, SOLUTION INTRAVENOUS CONTINUOUS
Status: DISCONTINUED | OUTPATIENT
Start: 2020-05-07 | End: 2020-05-07

## 2020-05-07 RX ORDER — DEXAMETHASONE SODIUM PHOSPHATE 4 MG/ML
VIAL (ML) INJECTION AS NEEDED
Status: DISCONTINUED | OUTPATIENT
Start: 2020-05-07 | End: 2020-05-07 | Stop reason: SURG

## 2020-05-07 RX ORDER — NALOXONE HYDROCHLORIDE 0.4 MG/ML
80 INJECTION, SOLUTION INTRAMUSCULAR; INTRAVENOUS; SUBCUTANEOUS AS NEEDED
Status: DISCONTINUED | OUTPATIENT
Start: 2020-05-07 | End: 2020-05-07

## 2020-05-07 RX ORDER — HYDROCODONE BITARTRATE AND ACETAMINOPHEN 10; 325 MG/1; MG/1
2 TABLET ORAL AS NEEDED
Status: DISCONTINUED | OUTPATIENT
Start: 2020-05-07 | End: 2020-05-07

## 2020-05-07 RX ORDER — CLINDAMYCIN PHOSPHATE 900 MG/50ML
900 INJECTION INTRAVENOUS ONCE
Status: COMPLETED | OUTPATIENT
Start: 2020-05-07 | End: 2020-05-07

## 2020-05-07 RX ORDER — ONDANSETRON 2 MG/ML
4 INJECTION INTRAMUSCULAR; INTRAVENOUS AS NEEDED
Status: DISCONTINUED | OUTPATIENT
Start: 2020-05-07 | End: 2020-05-07

## 2020-05-07 RX ORDER — MIDAZOLAM HYDROCHLORIDE 1 MG/ML
INJECTION INTRAMUSCULAR; INTRAVENOUS AS NEEDED
Status: DISCONTINUED | OUTPATIENT
Start: 2020-05-07 | End: 2020-05-07 | Stop reason: SURG

## 2020-05-07 RX ORDER — GLYCOPYRROLATE 0.2 MG/ML
INJECTION, SOLUTION INTRAMUSCULAR; INTRAVENOUS AS NEEDED
Status: DISCONTINUED | OUTPATIENT
Start: 2020-05-07 | End: 2020-05-07 | Stop reason: SURG

## 2020-05-07 RX ORDER — ACETAMINOPHEN 500 MG
1000 TABLET ORAL ONCE
Status: DISCONTINUED | OUTPATIENT
Start: 2020-05-07 | End: 2020-05-07 | Stop reason: HOSPADM

## 2020-05-07 RX ORDER — HYDROMORPHONE HYDROCHLORIDE 1 MG/ML
0.4 INJECTION, SOLUTION INTRAMUSCULAR; INTRAVENOUS; SUBCUTANEOUS EVERY 5 MIN PRN
Status: DISCONTINUED | OUTPATIENT
Start: 2020-05-07 | End: 2020-05-07

## 2020-05-07 RX ORDER — HYDROMORPHONE HYDROCHLORIDE 1 MG/ML
INJECTION, SOLUTION INTRAMUSCULAR; INTRAVENOUS; SUBCUTANEOUS
Status: COMPLETED
Start: 2020-05-07 | End: 2020-05-07

## 2020-05-07 RX ORDER — ONDANSETRON 2 MG/ML
INJECTION INTRAMUSCULAR; INTRAVENOUS AS NEEDED
Status: DISCONTINUED | OUTPATIENT
Start: 2020-05-07 | End: 2020-05-07 | Stop reason: SURG

## 2020-05-07 RX ADMIN — SODIUM CHLORIDE, SODIUM LACTATE, POTASSIUM CHLORIDE, CALCIUM CHLORIDE: 600; 310; 30; 20 INJECTION, SOLUTION INTRAVENOUS at 07:34:00

## 2020-05-07 RX ADMIN — SODIUM CHLORIDE, SODIUM LACTATE, POTASSIUM CHLORIDE, CALCIUM CHLORIDE: 600; 310; 30; 20 INJECTION, SOLUTION INTRAVENOUS at 08:33:00

## 2020-05-07 RX ADMIN — DEXAMETHASONE SODIUM PHOSPHATE 8 MG: 4 MG/ML VIAL (ML) INJECTION at 07:52:00

## 2020-05-07 RX ADMIN — MIDAZOLAM HYDROCHLORIDE 2 MG: 1 INJECTION INTRAMUSCULAR; INTRAVENOUS at 07:34:00

## 2020-05-07 RX ADMIN — ROCURONIUM BROMIDE 50 MG: 10 INJECTION, SOLUTION INTRAVENOUS at 07:43:00

## 2020-05-07 RX ADMIN — NEOSTIGMINE METHYLSULFATE 5 MG: 1 INJECTION INTRAVENOUS at 09:22:00

## 2020-05-07 RX ADMIN — GLYCOPYRROLATE 0.6 MG: 0.2 INJECTION, SOLUTION INTRAMUSCULAR; INTRAVENOUS at 09:22:00

## 2020-05-07 RX ADMIN — ONDANSETRON 4 MG: 2 INJECTION INTRAMUSCULAR; INTRAVENOUS at 09:03:00

## 2020-05-07 RX ADMIN — CLINDAMYCIN PHOSPHATE 900 MG: 900 INJECTION INTRAVENOUS at 07:44:00

## 2020-05-07 NOTE — ANESTHESIA POSTPROCEDURE EVALUATION
Phillip 2484 Patient Status:  Hospital Outpatient Surgery   Age/Gender 50year old female MRN [id number]   [facility] SURGERY Attending Orion Morgan MD   Hosp Day # 0 PCP Steven Wang MD       Anesthesia

## 2020-05-07 NOTE — ANESTHESIA PREPROCEDURE EVALUATION
PRE-OP EVALUATION    Patient Name: Yoko Hussein    Pre-op Diagnosis: Strain of gluteus medius of right lower extremity, subsequent encounter [S76.011D]  Status post right hip replacement [Z96.641]    Procedure(s):  Augusto hyperlipidemia                                  Endo/Other                           (+) arthritis       Pulmonary    Negative pulmonary ROS.                        Neuro/Psych                 (+) neuromuscular disease             Patient Active Problem Lis 04/24/2020    K 4.90 04/24/2020     04/24/2020    CO2 27.4 04/24/2020    BUN 10.0 04/24/2020    CREATSERUM 1.02 04/24/2020    GLU 79 04/24/2020    CA 9.1 04/24/2020            Airway      Mallampati: II  Mouth opening: >3 FB  TM distance: > 6 cm  Nec

## 2020-05-07 NOTE — INTERVAL H&P NOTE
Pre-op Diagnosis: Strain of gluteus medius of right lower extremity, subsequent encounter [S76.011D]  Status post right hip replacement [Z96.641]    The above referenced H&P was reviewed by Michele Tellez MD on 5/7/2020, the patient was examined and no s

## 2020-05-07 NOTE — OPERATIVE REPORT
[address]    PATIENT'S NAME: Jaciel Perales   ATTENDING PHYSICIAN: Neptali Osborn M.D. OPERATING PHYSICIAN: Neptali Osborn M.D.    PATIENT ACCOUNT#:   [de-identified]    LOCATION:  [address] [place] [address] EDWP 10  MEDICAL RECORD #:   [id number]       DA could be mobilized laterally nicely. The bone surface was then roughened using a curette and rongeur to create a bleeding surface.   At this point, the punch was used more medial at the greater trochanter where 2 bioabsorbable suture anchors were placed wi

## 2020-05-07 NOTE — H&P
Lloyd Blanchard   4/24/2020 12:45 PM   Office Visit   MRN:  [id number]   Description: 53year old female Provider: Barbara Wynn MD Department: [facility]   Scanning Cover Sheet     Click to print K[address] 852 for scanning   Office • Chlorhexidine Gluconate (HIBICLENS) 4 % External Liquid Apply 30 mL topically daily as needed. 118 mL 0   • Acetaminophen-Codeine #3 300-30 MG Oral Tab Take 1 tablet by mouth every 4 (four) hours as needed for Pain.  30 tablet 0   • Diclofenac Sodium 75 M MENTAL STATUS: Awake, alert, oriented x 3  HEAD/NECK: Head is normocephalic   EYES: EOMI  Heart: Regular rate and rhythm  Lungs: Clear to auscultation bilaterally  SKIN EXAM: Skin is intact.   No rashes, lesions, ecchymosis or abrasions  LYMPH EXAM: There i

## 2020-05-07 NOTE — BRIEF OP NOTE
Pre-Operative Diagnosis: Strain of gluteus medius of right lower extremity, subsequent encounter [S76.011D]  Status post right hip replacement [Z96.641]     Post-Operative Diagnosis: Strain of gluteus medius of right lower extremity, subsequent encounter [

## 2020-05-07 NOTE — ANESTHESIA PROCEDURE NOTES
Airway  Urgency: elective      General Information and Staff    Patient location during procedure: OR  Anesthesiologist: Ciarra Hollins MD  Performed: anesthesiologist     Indications and Patient Condition  Indications for airway management: anesthesia  S

## 2020-05-07 NOTE — OR NURSING
Crutch teaching done. Pt stated she knows how to use them. All discharge instructions given. Pt walked with crutches to the bathroom without difficulty. Pain 4/10 she stated she has pain medication at home and does not need a script.

## 2020-05-27 ENCOUNTER — OFFICE VISIT (OUTPATIENT)
Dept: FAMILY MEDICINE CLINIC | Facility: CLINIC | Age: 45
End: 2020-05-27
Payer: COMMERCIAL

## 2020-05-27 VITALS
OXYGEN SATURATION: 98 % | SYSTOLIC BLOOD PRESSURE: 124 MMHG | HEART RATE: 76 BPM | DIASTOLIC BLOOD PRESSURE: 80 MMHG | RESPIRATION RATE: 18 BRPM | TEMPERATURE: 98 F | HEIGHT: 64 IN | BODY MASS INDEX: 31.96 KG/M2 | WEIGHT: 187.19 LBS

## 2020-05-27 DIAGNOSIS — E78.2 MIXED HYPERLIPIDEMIA: ICD-10-CM

## 2020-05-27 DIAGNOSIS — I10 ESSENTIAL HYPERTENSION, BENIGN: Primary | ICD-10-CM

## 2020-05-27 DIAGNOSIS — R79.89 ABNORMAL TSH: ICD-10-CM

## 2020-05-27 PROCEDURE — 99214 OFFICE O/P EST MOD 30 MIN: CPT | Performed by: FAMILY MEDICINE

## 2020-05-27 RX ORDER — LOSARTAN POTASSIUM 50 MG/1
50 TABLET ORAL DAILY
Qty: 90 TABLET | Refills: 1 | Status: SHIPPED | OUTPATIENT
Start: 2020-05-27

## 2020-05-27 RX ORDER — AMLODIPINE BESYLATE 5 MG/1
5 TABLET ORAL
Qty: 90 TABLET | Refills: 1 | Status: SHIPPED | OUTPATIENT
Start: 2020-05-27

## 2020-05-27 NOTE — PROGRESS NOTES
CHIEF COMPLAINT: Patient presents with:  Hypertension        HPI:     Moraima Avery is a 50year old female presents for HTN and HL f-u. Thyroid f-u. HTN f-u: Pt overall feels well and is tolerating her current medications- Losartan and Amlodipine.  Kelly Grandchild INJECTION (PAIN) Left 6/1/2018    Performed by Fátima Stroud MD at [address] [place] [address] Right 3/5/2020    Performed by Katia Lam MD at [facility] MAIN OR   • HIP TOTAL REPLACEMENT Left 1/31/2019    Performed by Urbano daily. (Patient not taking: Reported on 5/27/2020 ) 60 tablet 1   • Cholecalciferol (VITAMIN D) 2000 UNITS Oral Tab Take 2,000 Units by mouth daily.            Allergies:    Ampicillin              HIVES    Comment:Tolerated Ancef on 3/5/20    [facility] elements Control 05/07/2020 ok    • EXPIRATION DATE 05/07/2020 21/08/31    Orders Only on 04/24/2020   Component Date Value   • Patient Fasting? 04/24/2020 No    • Sodium 04/24/2020 137    • Potassium 04/24/2020 4.90    • Chloride 04/24/2020 103    • Carbon Dioxide • losartan Potassium 50 MG Oral Tab 90 tablet 1     Sig: Take 1 tablet (50 mg total) by mouth daily.        Health Maintenance:  Annual Physical due on 04/30/1976  Pap Smear,3 Years due on 04/25/2011  Mammogram due on 04/21/2015  Annual Depression Screen

## 2020-05-27 NOTE — PATIENT INSTRUCTIONS
As of October 6th 2014, the Drug Enforcement Agency [facility]) is reclassifying all hydrocodone combination medications from Schedule III to Schedule II. This includes medications such as Norco, Vicodin, Lortab, Zohydro, and Vicoprofen.      What this means for healthier can improve several of your heart risks at once. For instance, it helps you manage weight, cholesterol, and blood pressure. Here are ideas to help you make heart-healthy changes without giving up all the foods and flavors you love.   Getting start than what is listed here:  · Fruits and vegetables provide plenty of nutrients without a lot of calories. At meals, fill half your plate with these foods. Split the other half of your plate between whole grains and lean protein.   · Whole grains are high in cilantro, cinnamon, pepper, and rosemary. Mauro last reviewed this educational content on 10/1/2017  © 0733-8315 [employer]. [address] [facility], [address] [place] [address]. All rights reserved.  This information is not intended as a substitute

## 2020-08-03 ENCOUNTER — ORDER TRANSCRIPTION (OUTPATIENT)
Dept: ADMINISTRATIVE | Facility: HOSPITAL | Age: 45
End: 2020-08-03

## 2020-08-03 DIAGNOSIS — R20.0 NUMBNESS: Primary | ICD-10-CM

## 2020-12-22 DIAGNOSIS — I10 ESSENTIAL HYPERTENSION, BENIGN: ICD-10-CM

## 2020-12-22 RX ORDER — LOSARTAN POTASSIUM 50 MG/1
50 TABLET ORAL DAILY
Qty: 90 TABLET | Refills: 1 | Status: CANCELLED | OUTPATIENT
Start: 2020-12-22

## 2020-12-23 ENCOUNTER — PATIENT MESSAGE (OUTPATIENT)
Dept: FAMILY MEDICINE CLINIC | Facility: CLINIC | Age: 45
End: 2020-12-23

## 2020-12-23 NOTE — TELEPHONE ENCOUNTER
Pt requesting refill of   Requested Prescriptions     Pending Prescriptions Disp Refills   • losartan Potassium 50 MG Oral Tab 90 tablet 1     Sig: Take 1 tablet (50 mg total) by mouth daily.      Last Time Medication was Filled:  05/27/20    Last Office Vi

## 2020-12-31 DIAGNOSIS — I10 ESSENTIAL HYPERTENSION, BENIGN: ICD-10-CM

## 2020-12-31 RX ORDER — LOSARTAN POTASSIUM 50 MG/1
50 TABLET ORAL DAILY
Qty: 90 TABLET | Refills: 1 | Status: CANCELLED | OUTPATIENT
Start: 2020-12-31

## 2020-12-31 NOTE — TELEPHONE ENCOUNTER
Pt requesting refill of   Requested Prescriptions     Pending Prescriptions Disp Refills   • losartan Potassium 50 MG Oral Tab 90 tablet 1     Sig: Take 1 tablet (50 mg total) by mouth daily.        Protocol failed, unable to approve, due to due for px appt

## 2021-03-29 ENCOUNTER — TELEPHONE (OUTPATIENT)
Dept: FAMILY MEDICINE CLINIC | Facility: CLINIC | Age: 46
End: 2021-03-29

## 2021-03-29 NOTE — TELEPHONE ENCOUNTER
Overdue for annual physical and pap smear not due until 9/2021, or we can do a little early, if needed. Thanks!

## 2024-10-13 NOTE — PHYSICAL THERAPY NOTE
PHYSICAL THERAPY HIP EVALUATION - INPATIENT     Room Number: 358/358-A  Evaluation Date: 1/31/2019  Type of Evaluation: Initial  Physician Order: PT Eval and Treat    Presenting Problem: S/p Left GARIMA on 01/31/19  Reason for Therapy: Mobility Dysfunction an Unclear etiology  Hold statin for now  GI consult   Abduction)  Fall Risk: High fall risk    WEIGHT BEARING RESTRICTION  Weight Bearing Restriction: L lower extremity           L Lower Extremity: Weight Bearing as Tolerated    PAIN ASSESSMENT  Ratin  Location: Left hip @ surgical site  Management Techni Scale): 42.13   CMS Modifier (G-Code): CK    FUNCTIONAL ABILITY STATUS  Gait Assessment   Gait Assistance: Dependent assistance(Actual assist - min)  Distance (ft): 3 ft to bedside chair - limited distance due to c/o lightheadedness  Assistive Device: Roll site,minimal awareness re: anterior hip precautions. Functional outcome measures completed include AM PAC scores. Based on this evaluation, patient's clinical presentation is stable and overall the evaluation complexity is considered low.   These impairme

## (undated) DEVICE — BLADE ELECTRODE: Brand: EDGE

## (undated) DEVICE — TOTAL HIP CDS: Brand: MEDLINE INDUSTRIES, INC.

## (undated) DEVICE — SOL  .9 1000ML BTL

## (undated) DEVICE — 450 ML BOTTLE OF 0.05% CHLORHEXIDINE GLUCONATE IN 99.95% STERILE WATER FOR IRRIGATION, USP AND APPLICATOR.: Brand: IRRISEPT ANTIMICROBIAL WOUND LAVAGE

## (undated) DEVICE — SUTURE FIBERWIRE 2 AR-7202

## (undated) DEVICE — PILLOW ABDUCTION HIP SM

## (undated) DEVICE — Device

## (undated) DEVICE — STERILE SYNTHETIC POLYISOPRENE POWDER-FREE SURGICAL GLOVES WITH HYDROGEL COATING, SMOOTH FINISH, STRAIGHT FINGER: Brand: PROTEXIS

## (undated) DEVICE — 3M™ MICROFOAM™ TAPE 1528-4: Brand: 3M™ MICROFOAM™

## (undated) DEVICE — KENDALL SCD EXPRESS SLEEVES, KNEE LENGTH, MEDIUM: Brand: KENDALL SCD

## (undated) DEVICE — VIOLET BRAIDED (POLYGLACTIN 910), SYNTHETIC ABSORBABLE SUTURE: Brand: COATED VICRYL

## (undated) DEVICE — SPECIMEN CONTAINER,POSITIVE SEAL INDICATOR, OR PACKAGED: Brand: PRECISION

## (undated) DEVICE — PIN STEINMAN SMOOTH 1/8 9

## (undated) DEVICE — GOWN,SIRUS,FABRIC-REINFORCED,X-LARGE: Brand: MEDLINE

## (undated) DEVICE — DRESSING AQUACEL AG 3.5 X 10

## (undated) DEVICE — HOOD, PEEL-AWAY: Brand: FLYTE

## (undated) DEVICE — SOL  .9 1000ML BAG

## (undated) DEVICE — SUTURE VICRYL 2-0 CP-1

## (undated) DEVICE — STERILE POLYISOPRENE POWDER-FREE SURGICAL GLOVES: Brand: PROTEXIS

## (undated) DEVICE — SUTURE MONOCRYL 3-0 PS-2

## (undated) DEVICE — NEEDLE ANCHOR 1834-5-D

## (undated) DEVICE — GAMMEX® PI HYBRID SIZE 8.5, STERILE POWDER-FREE SURGICAL GLOVE, POLYISOPRENE AND NEOPRENE BLEND: Brand: GAMMEX

## (undated) DEVICE — 3M™ STERI-DRAPE™ U-DRAPE 1015: Brand: STERI-DRAPE™

## (undated) DEVICE — DECANTER BAG 9": Brand: MEDLINE INDUSTRIES, INC.

## (undated) DEVICE — WRAP COOLING HIP W/ICE PILLOW

## (undated) DEVICE — 3M™ STERI-STRIP™ REINFORCED ADHESIVE SKIN CLOSURES, R1547, 1/2 IN X 4 IN (12 MM X 100 MM), 6 STRIPS/ENVELOPE: Brand: 3M™ STERI-STRIP™

## (undated) DEVICE — GLOVE SURG TRIUMPH SZ 8-1/2

## (undated) DEVICE — CHLORAPREP 26ML APPLICATOR

## (undated) DEVICE — CHLORAPREP ORANGE TINT 10.5ML

## (undated) DEVICE — GAMMEX® NON-LATEX PI ORTHO SIZE 8, STERILE POLYISOPRENE POWDER-FREE SURGICAL GLOVE: Brand: GAMMEX

## (undated) DEVICE — SUTURE VICRYL 2-0 CT-1

## (undated) DEVICE — DRAPE,U/SHT,SPLIT,FILM,60X84,STERILE: Brand: MEDLINE

## (undated) DEVICE — SPONGE RAYTEC 4X4 RF DETECT

## (undated) DEVICE — SUTURE NABSB OTHCRD 2 OS-6

## (undated) DEVICE — Device: Brand: STABLECUT®

## (undated) DEVICE — 3M™ COBAN™ NL STERILE NON-LATEX SELF-ADHERENT WRAP, 2084S, 4 IN X 5 YD (10 CM X 4,5 M), 18 ROLLS/CASE: Brand: 3M™ COBAN™

## (undated) DEVICE — NEEDLE ANCHOR 1832-5D

## (undated) DEVICE — MLPD DISPOSABLE PAD (6' ROLL) 3 ROLLS: Brand: SCHAERER MEDICAL USA

## (undated) DEVICE — ADHESIVE MASTISOL 2/3CC VL

## (undated) NOTE — LETTER
07/07/20        [address] [place] [id number]      Dear Fadia Bobo,    Our records indicate that you have outstanding lab work and or testing that was ordered for you and has not yet been completed:        COMP METABOLIC PANE

## (undated) NOTE — IP AVS SNAPSHOT
Patient Demographics     Address  [id number] [address] Phone  [phone] [facility])  [phone] (Mobile) *Preferred* E-mail Address  [email or web address]      Emergency Contact(s)     Name Relation Home Work Pax, [place] o AQUACEL dressing is waterproof and does not require being covered before showering.   o Pat dressing and surrounding skin dry after shower                      AQUACEL    o MEDIPORE/COVERLET dressing is NOT waterproof and REQUIRES being covered with a mike ? Watch for increased redness, warmth, any odor, increased drainage or opening of the incision. A little clear yellow or blood tinged drainage is normal up to 2 weeks after surgery but it should be less every day until it stops.   ? Call physician if you no ? Apply ice  or cold therapy to surgical site for 20 minutes at least four times a day, especially after therapy. Be sure there is a thin cloth barrier between skin and ice or cold therapy. ?  Change position at least every 45 minutes while awake to avoid ? Schedule outpatient physical therapy per your surgeon’s orders. ? Schedule one week follow up after surgery WITH PRIMARY CARE PHYSICIAN; review your medications over last 6 months.   Your body gets stressed by surgery and that stress can affect all your know that you had your hip replaced, as you will most likely set off the metal detector. The doctors no longer provide an identification card for this as they are easily copied.  ALSO request a wheelchair the first year to board and get off a plane…this aid TWO equal parts. Slide one part on the calf and then the second one directly on top of the first creating the double layer. ? Makes sure it is NOT bunched up anywhere.   ? IF the tubigrip feels TOO tight, then cut it in half and only use one layer on the Diclofenac Sodium 75 MG Tbec  Commonly known as:  VOLTAREN  Notes to patient:  DO NOT RESUME THIS MEDICATION UNTIL YOU HAVE FINISHED THE CELEBREX AND THE APIXABAN DOSES      Take 1 tablet (75 mg total) by mouth 2 (two) times daily.    Maribel Bravo MD [id number] ketorolac tromethamine (TORADOL) 30 MG/ML injection 30 mg 03/05/20 1707 Given      [id number] ketorolac tromethamine (TORADOL) 30 MG/ML injection 30 mg 03/06/20 0012 Given      [id number] ketorolac tromethamine (TORADOL) 30 MG/ML injection 30 mg 0 Blood — 03/06/20 4985          Components    Component Value Reference Range Flag Lab   WBC 9.5 4.0 - 11.0 x10(3) uL — [facility]   RBC 4.25 3.80 - 5.30 x10(6)uL — [facility]   HGB 11.8 12.0 - 16.0 g/dL  [facility]   HCT 36.7 35.0 - 48.0 % — [facility]   [address], Ke Alonso MD   Family Medicine   Pre-op exam +4 more   Dx   Pre-Op Exam     Reason for Visit           Reason for Visit     Pre-Op Exam Right Hip Replacement with  on 3/5/2020   Addendum N • KIDNEY STONE     • Osteoarthritis       knees, hips   • PONV (postoperative nausea and vomiting)                 Past Surgical History:   Procedure Laterality Date   • CHOLECYSTECTOMY       • HIP INJECTION (PAIN) Left 6/1/2018     Performed by Mamadou Blancas, Cardiovascular: Negative for chest pain, palpitations and leg swelling. Gastrointestinal: Negative for nausea, vomiting and diarrhea. Musculoskeletal: Positive for joint pain.         Rt hip pain   Neurological: Negative for dizziness, weakness, light-h Value:This result contains rich text formatting which cannot be displayed here.    • Creatinine 01/31/2019 1.06*   • GFR, Non- 01/31/2019 64    • GFR, -American 01/31/2019 73    • Glucose 02/01/2019 102*   • Sodium 02 - RTC in 6 months for f-u     - LIPID PANEL; Future     4. Obesity (BMI 30-39. 9)   Counseled pt on appropriate healthy diet and regular exercise regimen to maintain a healthy wt.  Will cont to monitor wt at future visits.      - HEMOGLOBIN A1C; Future  - LI All notes   Instructions         Return in about 3 months (around 5/12/2020) for annual physical at your earliest convenience (once recovered from surgery). Eating Heart-Healthy Foods  Eating has a big impact on your heart health.  In fact, eating heal healthier foods as well as exercising regularly. To help you track your progress, keep a diary to record what you eat and how often you exercise. Choose the right foods  Aim to make these foods staples of your diet.  If you have diabetes, you may have diff · Broil, boil, bake, steam, grill, and microwave food without added fats. · Choose ingredients that spice up your food without adding calories, fat, or sodium. Try these items: horseradish, hot sauce, lemon, mustard, nonfat salad dressings, and vinegar.  Gwen Hassan 1. Consult to Hospitalist [[id number]] ordered by Ria Muñiz PA-C at [address] [facility] Patient Status:  Inpatient    1975 MRN [id number]   [facility] 3SW-A [facility] Medications:  No current facility-administered medications on file prior to encounter.    Acetaminophen-Codeine #3 300-30 MG Oral Tab, Take 1 tablet by mouth every 4 (four) hours as needed for Pain., Disp: 30 tablet, Rfl: 0  Diclofenac Sodium 75 MG Oral Tab No results for input(s): PTP, INR in the last 168 hours. No results for input(s): TROP, CK in the last 168 hours. Imaging: Imaging data reviewed in Epic. ASSESSMENT / PLAN:     1. Osteoarthritis I left hip- S/P left hip arthroplasty.  Management • High blood pressure    • KIDNEY STONE    • Osteoarthritis     knees, hips   • PONV (postoperative nausea and vomiting)    • Visual impairment     glasses/contacts[SP.2]       Past Surgical History[SP.1]  Past Surgical History:   Procedure Laterality Date Lower extremity strength is within functional limits[SP.1] except right hip[SP.2]    BALANCE[SP.1]  Static Sitting: Good  Dynamic Sitting: Good  Static Standing: Poor +  Dynamic Standing: Poor +[SP.2]    ADDITIONAL TESTS                                 ACT stood to rw with cga. Pt able to perform right tke, marching in place with no right knee buckling, instructed in gait sequencing. Pt amb with rw with gait training emphasis on heel toe pattern, upright posture.   Pt returned to  chair, educated about PO PT Treatment Plan: Endurance; Energy conservation;Patient education;Gait training;Range of motion;Strengthening;Stair training  Rehab Potential : Good  Frequency (Obs): BID  Number of Visits to Meet Established Goals: 3[SP.2]      CURRENT GOALS  Goal #1  Pa Filed:  3/6/2020 12:58 PM Date of Service:  3/6/2020 12:51 PM Status:  Signed    :  Filemon Pickering OT (Occupational Therapist)       OCCUPATIONAL THERAPY QUICK EVALUATION - INPATIENT    Room Number: 368/368-A  Evaluation Date: 3/6/2020     Type Prior Level of Function: Independent. Works [employer], drives. Lives with SO and dtr , both can assist during recovery.  Reports good recovery from prior GARIMA    SUBJECTIVE   pleasant, motivated to regain independence    Patient self-stated goal is to go home today sequencing, and safety for ADLs. OT educated patient on adaptive techniques and equipment for lower body dressing. Patient was able to don lower body garments with supervision. using  reacher, sock aid.     .   OT instructed patient on safe technique for s of daily living. Research supports that patients with this level of impairment often benefit from home.    Recommend home with supervision/assistance as needed during recovery    Patient Complexity  Occupational Profile/Medical History  LOW - Brief history 10/9/2020 3:45 PM Joselin Gamboa  E Brittaney Peralta      Multidisciplinary Problems     Active Goals        Problem: Patient/Family Goals    Goal Priority Disciplines Outcome Interventions   Patient/Family Long Term Goal     Interdisciplinary Pro

## (undated) NOTE — IP AVS SNAPSHOT
Patient Demographics     Address  [address] [place] [address] Phone  [phone] [facility])  [phone] (Work)  [phone] (Mobile) *Preferred* E-mail Address  [email or web address]      Emergency Contact(s)     Name Relation Home Work Mo activities with your surgeon. Restrictions  ? For hip replacement surgery, follow instructions provided by physical therapy    No smoking  ? Avoid smoking. It is known to cause breathing problems and can decrease the rate of healing.     Incision care/ ? Surgical discomfort is normal for one to two months. ? Have realistic goals and keep a positive outlook. ? Keep pain manageable; pain should not disrupt your sleep or activities like getting out of bed or walking.   ? You may need pain medication regula or are visiting. ? Keep bed linen/clothing freshly laundered. ? Do not allow others or pets to touch your incision. ? Avoid people that have colds or the flu. ?  Your surgeon may recommend that you take antibiotics before you undergo any dental or other ? Turning in or out of surgical leg that is new  ? Increased numbness, tingling to leg  ? Inability to walk or put weight on your surgical leg      Signs of blood clot  ?  Pain, excessive tenderness, redness, or swelling in leg or calf (other than incision View Dr. Patricio DeWitt discharge education video at:  [email or web address]               Please check blood pressure daily, keep record and bring to appointment with Dr. Sarai Brennan MD   Please seek medical attention for systolic blood pressure les Joel Mckinney MD         docusate sodium 100 MG Caps  Commonly known as:  COLACE  Next dose due:  tonight      Take 1 capsule (100 mg total) by mouth 2 (two) times daily.    Emerson Pruitt MD         Ferrous Sulfate 325 (65 Fe) MG Tabs  Next dose due:  T [id number] docusate sodium (COLACE) cap 100 mg 02/02/19 0805 Given      [id number] ferrous sulfate EC tab 325 mg 02/02/19 0805 Given              Recent Vital Signs       Most Recent Value   Vitals  148/81 Filed at 02/02/2019 0730   Pulse  97 Filed at 02/02 Description: 37year old female Provider: Tara Molina MD Department: [facility]        Scanning Cover Sheet   Click to print Kwan Circe 852 for scanning                  Office Visit    1/22/2019   [address] [facility] Family History   Problem Relation Age of Onset   • Hypertension Father    • Hypertension Mother    • Cancer Maternal Grandmother     colon   ALLERGIES:     Ampicillin HIVES     REVIEW OF SYSTEMS:    GENERAL: denies fever, change in weight   MUSCULO medical management. The patient will plan on going home with home health postoperatively. The patient indicates understanding of these issues and agrees to the plan.    The patient is asked to return in 3 weeks   Ilsa Templeton MD[JL.1]          Electro • HIP INJECTION (PAIN) Left 6/1/2018    Performed by Osmani Moore MD at [address]      left partial nephrectomy   • LAPAROSCOPY,DIAGNOSTIC      ovarian cyst removed   • NEEDLE BIOPSY LEFT  2012    @[facility]   • Invalid input(s): ALPHOS, TBIL, DBIL, TPROT[CY. 2]      Radiology: Xr Hip W Or Wo Pelvis 1 View, Left (cpt=73501)    Result Date: 1/31/2019  PROCEDURE:  XR HIP W OR WO PELVIS 1 VIEW, LEFT (CPT=73501)  TECHNIQUE:  Unilateral view of the hip.   COMPARISON:  ED and note understanding and agreeing with therapeutic plan as outlined. D/w RN[CY. 1] /Amado[CY. 4]      Thank you for allowing me to participate in the care of this patient.      Sourav Carmichael MD  [facility] Hospitalist  Pager [phone]    1/31/2019  10:3 • HIP TOTAL REPLACEMENT Left 1/31/2019    Performed by Shawanda Chung MD at [facility] MAIN OR   • KIDNEY SURGERY      left partial nephrectomy   • LAPAROSCOPY,DIAGNOSTIC      ovarian cyst removed   • NEEDLE BIOPSY LEFT  2012    @[facility]   • TONSILLECTOMY Assistive Device: Rolling walker  Pattern: L Decreased stance time  Stoop/Curb Assistance: Not tested  Comment : moblity not tested in pm due to nausea, emesis and dizziness[BR.2]    Skilled Therapy Provided: AM: Pt seen on POD#1 for exercises, patient edu emesis during session, pt unable to tolerate mobility training. Pt able to recall 3/4 ant hip precautions. Pt was re educated on precautions.       DISCHARGE RECOMMENDATIONS[BR.1]  PT Discharge Recommendations: Home with home health PT[BR.2]    PLAN[BR.1] PMH as listed below     Problem List  Active Problems:    * No active hospital problems.  *      Past Medical History  Past Medical History:   Diagnosis Date   • Allergic rhinitis    • Cancer ([facility] [address]     left partial kidney removed/ surgery no chemo,  no radi · Overall Cognitive Status:  WFL - within functional limits    RANGE OF MOTION AND STRENGTH ASSESSMENT  Upper extremity ROM and strength are within functional limits     Lower extremity ROM is within functional limits except for the following: Left Hip ROM Comment : Above score is based on FIM definations    Skilled Therapy Provided: Evaluation completed. Patient was instructed & educated in post surgical precautions & weight bearing status. Issued handouts for precautions & reviewed multiple times during this limitations in independent bed mobility, transfers, and gait skills. The patient is below baseline and would benefit from skilled inpatient PT to address the above deficits to assist patient in returning to prior to level of function.   DISCHARGE RECOMMEND Filed:  2/1/2019  9:45 AM Date of Service:  2/1/2019  9:22 AM Status:  Signed    :  Natalia Drake OT (Occupational Therapist)       OCCUPATIONAL THERAPY QUICK EVALUATION - INPATIENT    Room Number: 358/358-A  Evaluation Date: 2/1/2019     Type \"I'llhave help for a couple of day, but everything is on one level so I'll be fine\"    Patient self-stated goal is to go home when ready    OBJECTIVE  Precautions: Hip abduction pillow;GARIMA - anterior; Other (Comment)(MD specific no active abduction)  Fall stood with supervision; pulled up pants with supervision; patient returned to sitting and KI donned to LLE; toilet transfer completed with min cues for safe UE/LE placement ; completed with supervision; OT instructed on bed mobility while maintaining hip p problem-focused assessments, limited treatment options    Overall Complexity  LOW     OT Discharge Recommendations: Home(family assist during recovery)  OT Device Recommendations: None    PLAN   Patient has been evaluated and presents with no skilled Occup

## (undated) NOTE — LETTER
Chantal Merchant [address]  [place], [address] [place] [address]    Consent for Operation  Date: __________________                                Time: _______________    1.  I authorize the performance upon Melfa Doll the following operation:  Procedur procedure has been videotaped, the surgeon will obtain the original videotape. The hospital will not be responsible for storage or maintenance of this tape.   7. For the purpose of advancing medical education, I consent to the admittance of observers to the STATEMENTS REQUIRING INSERTION OR COMPLETION WERE FILLED IN.     Signature of Patient:   ___________________________    When the patient is a minor or mentally incompetent to give consent:  Signature of person authorized to consent for patient: ____________ supplements, and pills I can buy without a prescription (including street drugs/illegal medications). Failure to inform my anesthesiologist about these medicines may increase my risk of anesthetic complications. iv.  If I am allergic to anything or have ha Anesthesiologist Signature     Date   Time  I have discussed the procedure and information above with the patient (or patient’s representative) and answered their questions. The patient or their representative has agreed to have anesthesia services.     ___